# Patient Record
Sex: FEMALE | Race: WHITE | HISPANIC OR LATINO | Employment: FULL TIME | URBAN - METROPOLITAN AREA
[De-identification: names, ages, dates, MRNs, and addresses within clinical notes are randomized per-mention and may not be internally consistent; named-entity substitution may affect disease eponyms.]

---

## 2018-01-26 ENCOUNTER — OFFICE VISIT (OUTPATIENT)
Dept: URGENT CARE | Facility: PHYSICIAN GROUP | Age: 58
End: 2018-01-26
Payer: COMMERCIAL

## 2018-01-26 VITALS
TEMPERATURE: 98.1 F | WEIGHT: 150 LBS | DIASTOLIC BLOOD PRESSURE: 70 MMHG | RESPIRATION RATE: 16 BRPM | HEART RATE: 80 BPM | OXYGEN SATURATION: 95 % | SYSTOLIC BLOOD PRESSURE: 142 MMHG | HEIGHT: 63 IN | BODY MASS INDEX: 26.58 KG/M2

## 2018-01-26 DIAGNOSIS — J40 BRONCHITIS: ICD-10-CM

## 2018-01-26 DIAGNOSIS — H66.003 ACUTE SUPPURATIVE OTITIS MEDIA OF BOTH EARS WITHOUT SPONTANEOUS RUPTURE OF TYMPANIC MEMBRANES, RECURRENCE NOT SPECIFIED: ICD-10-CM

## 2018-01-26 PROCEDURE — 99203 OFFICE O/P NEW LOW 30 MIN: CPT | Performed by: PHYSICIAN ASSISTANT

## 2018-01-26 RX ORDER — BENZONATATE 100 MG/1
200 CAPSULE ORAL 3 TIMES DAILY PRN
Qty: 30 CAP | Refills: 0 | Status: SHIPPED | OUTPATIENT
Start: 2018-01-26 | End: 2018-02-26

## 2018-01-26 RX ORDER — AZITHROMYCIN 250 MG/1
TABLET, FILM COATED ORAL
Qty: 6 TAB | Refills: 0 | Status: SHIPPED | OUTPATIENT
Start: 2018-01-26 | End: 2018-02-26

## 2018-01-26 ASSESSMENT — PAIN SCALES - GENERAL: PAINLEVEL: 8=MODERATE-SEVERE PAIN

## 2018-01-27 ASSESSMENT — ENCOUNTER SYMPTOMS
CHILLS: 1
RHINORRHEA: 1
DIARRHEA: 0
ABDOMINAL PAIN: 0
SHORTNESS OF BREATH: 0
EYE DISCHARGE: 0
WHEEZING: 0
DIZZINESS: 0
MYALGIAS: 1
COUGH: 1
TINGLING: 0
HEADACHES: 0
FEVER: 0
EYE REDNESS: 0
VOMITING: 0
SORE THROAT: 1
NECK PAIN: 0
SPUTUM PRODUCTION: 1

## 2018-01-27 NOTE — PROGRESS NOTES
"Subjective:      Inessa Mcintyre is a 57 y.o. female who presents with Cough (x2 days. Cough, body aches, fever/chills.)            Cough   This is a new problem. Episode onset: 3-4 days ago. The problem has been gradually worsening. The problem occurs every few minutes. The cough is non-productive. Associated symptoms include chills, ear congestion, ear pain, myalgias, nasal congestion, postnasal drip, rhinorrhea and a sore throat. Pertinent negatives include no chest pain, eye redness, fever, headaches, rash, shortness of breath or wheezing. Nothing aggravates the symptoms. She has tried OTC cough suppressant for the symptoms. The treatment provided mild relief. Her past medical history is significant for bronchitis. There is no history of asthma or pneumonia.       Review of Systems   Constitutional: Positive for chills and malaise/fatigue. Negative for fever.   HENT: Positive for congestion, ear discharge, ear pain, postnasal drip, rhinorrhea and sore throat.    Eyes: Negative for discharge and redness.   Respiratory: Positive for cough and sputum production. Negative for shortness of breath and wheezing.    Cardiovascular: Negative for chest pain and leg swelling.   Gastrointestinal: Negative for abdominal pain, diarrhea and vomiting.   Genitourinary: Negative for dysuria and urgency.   Musculoskeletal: Positive for myalgias. Negative for neck pain.   Skin: Negative for itching and rash.   Neurological: Negative for dizziness, tingling and headaches.          Objective:     /70   Pulse 80   Temp 36.7 °C (98.1 °F)   Resp 16   Ht 1.6 m (5' 3\")   Wt 68 kg (150 lb)   SpO2 95%   Breastfeeding? No   BMI 26.57 kg/m²    PMH:  has a past medical history of Diabetes (CMS-[facility]).  MEDS:   Current Outpatient Prescriptions:   •  benzonatate (TESSALON) 100 MG Cap, Take 2 Caps by mouth 3 times a day as needed for Cough., Disp: 30 Cap, Rfl: 0  •  azithromycin (ZITHROMAX) 250 MG Tab, Take 2 tabs today, then 1 tab " daily til completed., Disp: 6 Tab, Rfl: 0  •  alprazolam (XANAX) 0.25 MG TABS, Take 1 Tab by mouth at bedtime as needed for Sleep., Disp: 30 Tab, Rfl: 0  ALLERGIES: No Known Allergies  SURGHX:   Past Surgical History:   Procedure Laterality Date   • ABDOMINAL HYSTERECTOMY TOTAL       SOCHX:  reports that she has never smoked. She has never used smokeless tobacco. She reports that she does not drink alcohol or use drugs.  FH: Family history was reviewed, no pertinent findings to report    Physical Exam   Constitutional: She is oriented to person, place, and time. She appears well-developed and well-nourished.   HENT:   Head: Normocephalic and atraumatic.   Mouth/Throat: No oropharyngeal exudate.   Ears- Canals clear- Bilateral TMs with bulging erythematous- and purulent middle ear effusion. Pos. PND, with slight erythema- without tonsillar edema or exudate.   Mild discharge noted bilaterally- to nares.      Eyes: EOM are normal. Pupils are equal, round, and reactive to light.   Neck: Normal range of motion. Neck supple.   Cardiovascular: Normal rate and regular rhythm.    No murmur heard.  Pulmonary/Chest: Effort normal and breath sounds normal. No respiratory distress.   Musculoskeletal: Normal range of motion. She exhibits no tenderness.   Lymphadenopathy:     She has no cervical adenopathy.   Neurological: She is alert and oriented to person, place, and time.   Skin: Skin is warm. No rash noted.   Psychiatric: She has a normal mood and affect. Her behavior is normal.   Vitals reviewed.              Assessment/Plan:     1. Bronchitis  - benzonatate (TESSALON) 100 MG Cap; Take 2 Caps by mouth 3 times a day as needed for Cough.  Dispense: 30 Cap; Refill: 0    2. Acute suppurative otitis media of both ears without spontaneous rupture of tympanic membranes, recurrence not specified  - azithromycin (ZITHROMAX) 250 MG Tab; Take 2 tabs today, then 1 tab daily til completed.  Dispense: 6 Tab; Refill: 0     Encouraged OTC  supportive meds PRN. Humidification, increase fluids, avoid night time dairy.   Patient given precautionary s/sx that mandate immediate follow up and evaluation in the ED. Advised of risks of not doing so.    DDX, Supportive care, and indications for immediate follow-up discussed with patient.    Instructed to return to clinic or nearest emergency department if we are not available for any change in condition, further concerns, or worsening of symptoms.    The patient demonstrated a good understanding and agreed with the treatment plan.

## 2018-02-26 ENCOUNTER — OFFICE VISIT (OUTPATIENT)
Dept: URGENT CARE | Facility: CLINIC | Age: 58
End: 2018-02-26
Payer: COMMERCIAL

## 2018-02-26 ENCOUNTER — HOSPITAL ENCOUNTER (OUTPATIENT)
Facility: MEDICAL CENTER | Age: 58
End: 2018-02-26
Attending: PHYSICIAN ASSISTANT
Payer: COMMERCIAL

## 2018-02-26 ENCOUNTER — HOSPITAL ENCOUNTER (OUTPATIENT)
Dept: RADIOLOGY | Facility: MEDICAL CENTER | Age: 58
End: 2018-02-26
Attending: PHYSICIAN ASSISTANT
Payer: COMMERCIAL

## 2018-02-26 VITALS
HEIGHT: 63 IN | RESPIRATION RATE: 14 BRPM | WEIGHT: 150 LBS | HEART RATE: 68 BPM | OXYGEN SATURATION: 99 % | BODY MASS INDEX: 26.58 KG/M2 | DIASTOLIC BLOOD PRESSURE: 74 MMHG | TEMPERATURE: 97.3 F | SYSTOLIC BLOOD PRESSURE: 134 MMHG

## 2018-02-26 DIAGNOSIS — R10.9 LEFT FLANK PAIN: ICD-10-CM

## 2018-02-26 DIAGNOSIS — R10.9 LEFT FLANK PAIN: Primary | ICD-10-CM

## 2018-02-26 LAB
APPEARANCE UR: CLEAR
BILIRUB UR STRIP-MCNC: NORMAL MG/DL
COLOR UR AUTO: NORMAL
GLUCOSE UR STRIP.AUTO-MCNC: NORMAL MG/DL
KETONES UR STRIP.AUTO-MCNC: NORMAL MG/DL
LEUKOCYTE ESTERASE UR QL STRIP.AUTO: NORMAL
NITRITE UR QL STRIP.AUTO: NORMAL
PH UR STRIP.AUTO: 6 [PH] (ref 5–8)
PROT UR QL STRIP: NORMAL MG/DL
RBC UR QL AUTO: NORMAL
SP GR UR STRIP.AUTO: 1
UROBILINOGEN UR STRIP-MCNC: 0.2 MG/DL

## 2018-02-26 PROCEDURE — 81002 URINALYSIS NONAUTO W/O SCOPE: CPT | Performed by: PHYSICIAN ASSISTANT

## 2018-02-26 PROCEDURE — 99214 OFFICE O/P EST MOD 30 MIN: CPT | Performed by: PHYSICIAN ASSISTANT

## 2018-02-26 PROCEDURE — 87186 SC STD MICRODIL/AGAR DIL: CPT

## 2018-02-26 PROCEDURE — 87086 URINE CULTURE/COLONY COUNT: CPT

## 2018-02-26 PROCEDURE — 87077 CULTURE AEROBIC IDENTIFY: CPT

## 2018-02-26 PROCEDURE — 74176 CT ABD & PELVIS W/O CONTRAST: CPT

## 2018-02-26 RX ORDER — CYCLOBENZAPRINE HCL 5 MG
5-10 TABLET ORAL 3 TIMES DAILY PRN
Qty: 30 TAB | Refills: 0 | Status: SHIPPED | OUTPATIENT
Start: 2018-02-26 | End: 2018-08-02

## 2018-02-26 RX ORDER — HYDROCODONE BITARTRATE AND ACETAMINOPHEN 5; 325 MG/1; MG/1
1-2 TABLET ORAL EVERY 4 HOURS PRN
Qty: 20 TAB | Refills: 0 | Status: SHIPPED | OUTPATIENT
Start: 2018-02-26 | End: 2018-03-01

## 2018-02-26 RX ORDER — NITROFURANTOIN 25; 75 MG/1; MG/1
100 CAPSULE ORAL 2 TIMES DAILY
Qty: 14 CAP | Refills: 0 | Status: SHIPPED | OUTPATIENT
Start: 2018-02-26 | End: 2018-03-05

## 2018-02-26 NOTE — PROGRESS NOTES
Subjective:      Pt is a 57 y.o. female who presents with Other (pt states she is really sore on (L) side and it hurts to walk. pt states this happened like 3 weeks ago and it went away)            HPI  Pt notes intense left flank pain x 1-2 days which happened and resolved 3 weeks ago but now is back. Pt denies known trauma or inciting event or trigger. Pt denies hx of kidney stones. Pt has not taken any Rx medications for this condition. Pt states the pain is a 9-10/10, aching in nature and worse at night. Pt denies CP, SOB, NVD, paresthesias, headaches, dizziness, change in vision, hives, or other joint pain or dysuria. The pt's medication list, problem list, and allergies have been evaluated and reviewed during today's visit.    PMH:  Past Medical History:   Diagnosis Date   • Diabetes (CMS-[facility])        PSH:  Past Surgical History:   Procedure Laterality Date   • ABDOMINAL HYSTERECTOMY TOTAL         Fam Hx:    family history includes Hypertension in her mother.  Family Status   Relation Status   • Mother        Soc HX:  Social History     Social History   • Marital status:      Spouse name: N/A   • Number of children: N/A   • Years of education: N/A     Occupational History   • Not on file.     Social History Main Topics   • Smoking status: Never Smoker   • Smokeless tobacco: Never Used   • Alcohol use No   • Drug use: No   • Sexual activity: No     Other Topics Concern   • Not on file     Social History Narrative   • No narrative on file         Medications:    Current Outpatient Prescriptions:   •  HYDROcodone-acetaminophen (NORCO) 5-325 MG Tab per tablet, Take 1-2 Tabs by mouth every four hours as needed for up to 3 days., Disp: 20 Tab, Rfl: 0  •  cyclobenzaprine (FLEXERIL) 5 MG tablet, Take 1-2 Tabs by mouth 3 times a day as needed., Disp: 30 Tab, Rfl: 0  •  nitrofurantoin monohydr macro (MACROBID) 100 MG Cap, Take 1 Cap by mouth 2 times a day for 7 days., Disp: 14 Cap, Rfl: 0  •  benzonatate  "(TESSALON) 100 MG Cap, Take 2 Caps by mouth 3 times a day as needed for Cough., Disp: 30 Cap, Rfl: 0  •  azithromycin (ZITHROMAX) 250 MG Tab, Take 2 tabs today, then 1 tab daily til completed., Disp: 6 Tab, Rfl: 0  •  alprazolam (XANAX) 0.25 MG TABS, Take 1 Tab by mouth at bedtime as needed for Sleep., Disp: 30 Tab, Rfl: 0      Allergies:  Patient has no known allergies.    ROS  Constitutional: Negative for fever, chills and malaise/fatigue.   HENT: Negative for congestion and sore throat.    Eyes: Negative for blurred vision, double vision and photophobia.   Respiratory: Negative for cough and shortness of breath.    Cardiovascular: Negative for chest pain and palpitations.   Gastrointestinal: Negative for heartburn, nausea, vomiting, abdominal pain, diarrhea and constipation.   Genitourinary: Negative for dysuria and +left flank pain.   Musculoskeletal:  +left flank pain  Skin: Negative for itching and rash.   Neurological: Negative for dizziness, tingling and headaches.   Endo/Heme/Allergies: Does not bruise/bleed easily.   Psychiatric/Behavioral: Negative for depression. The patient is not nervous/anxious.           Objective:     /74   Pulse 68   Temp 36.3 °C (97.3 °F)   Resp 14   Ht 1.6 m (5' 3\")   Wt 68 kg (150 lb)   SpO2 99%   BMI 26.57 kg/m²      Physical Exam   Musculoskeletal:        Lumbar back: She exhibits decreased range of motion, tenderness, pain and spasm. She exhibits no bony tenderness, no swelling, no edema, no deformity, no laceration and normal pulse.        Back:           Constitutional: PT is oriented to person, place, and time. PT appears well-developed and well-nourished. No distress.   HENT:   Head: Normocephalic and atraumatic.   Mouth/Throat: Oropharynx is clear and moist. No oropharyngeal exudate.   Eyes: Conjunctivae normal and EOM are normal. Pupils are equal, round, and reactive to light.   Neck: Normal range of motion. Neck supple. No thyromegaly present.   "   Cardiovascular: Normal rate, regular rhythm, normal heart sounds and intact distal pulses.  Exam reveals no gallop and no friction rub.    No murmur heard.  Pulmonary/Chest: Effort normal and breath sounds normal. No respiratory distress. PT has no wheezes. PT has no rales. Pt exhibits no tenderness.   Abdominal: Soft. Bowel sounds are normal. PT exhibits no distension and no mass. There is no tenderness. There is no rebound and no guarding.   Neurological: PT is alert and oriented to person, place, and time. PT has normal reflexes. No cranial nerve deficit.   Skin: Skin is warm and dry. No rash noted. PT is not diaphoretic. No erythema.       Psychiatric: PT has a normal mood and affect. PT behavior is normal. Judgment and thought content normal.      RADS:  Narrative       2/26/2018 3:44 PM    HISTORY/REASON FOR EXAM:  flank pain.      TECHNIQUE/EXAM DESCRIPTION AND NUMBER OF VIEWS:  CT scan renal/colic without contrast.    Helical images of the abdomen and pelvis were obtained from the diaphragmatic domes through the pubic symphysis.    Low dose optimization technique was utilized for this CT exam including automated exposure control and adjustment of the mA and/or kV according to patient size.    COMPARISON: 8/16/2013.    FINDINGS:  Evaluation of parenchymal and vascular structures is limited by the lack of intravenous contrast.    Lung bases:  There is mild bibasilar atelectasis    Abdomen:    No free air is seen in the abdomen or pelvis. A cyst is again seen in the right lobe of the liver measuring 2.5 cm. Small hypodense lesion is seen in the posterior right lobe of the liver also likely a cyst as it was seen on prior. Gallbladder appears   unremarkable. Spleen appears unremarkable. No adrenal mass is identified. There is no evidence of hydronephrosis. Pancreas appears unremarkable. Aorta is not aneurysmal. There is mild atherosclerotic plaque. There are small inguinal, retroperitoneal and   mesenteric  lymph nodes.    There is no evidence of bowel obstruction. There are colonic diverticula. Terminal ileum is unremarkable. There is no evidence of acute appendicitis.    Duodenal diverticula are likely present. Bladder is mildly distended. No free fluid is seen in the abdomen or pelvis. Uterus is surgically absent.       Impression       No evidence of renal, ureteral or bladder calculi. No hydronephrosis.    Colonic diverticulosis.    Hepatic cysts.    Duodenal diverticula are likely present.   Reading Provider Reading Date   Gilda Alvarado M.D. Feb 26, 2018   Signing Provider Signing Date Signing Time   Gilda Alvarado M.D. Feb 26, 2018  4:08 PM       Assessment/Plan:     1. Left flank pain    - POCT Urinalysis-->[facility]  - CT-ABDOMEN-PELVIS WITH; Future  - HYDROcodone-acetaminophen (NORCO) 5-325 MG Tab per tablet; Take 1-2 Tabs by mouth every four hours as needed for up to 3 days.  Dispense: 20 Tab; Refill: 0  - Consent for Opiate Prescription  - cyclobenzaprine (FLEXERIL) 5 MG tablet; Take 1-2 Tabs by mouth 3 times a day as needed.  Dispense: 30 Tab; Refill: 0  - nitrofurantoin monohydr macro (MACROBID) 100 MG Cap; Take 1 Cap by mouth 2 times a day for 7 days.  Dispense: 14 Cap; Refill: 0  - URINE CULTURE(NEW); Future    Concern for kidney hydronephrosis, other deep organ etiology  [place]  Aware web site evaluation: I have obtained and reviewed patient utilization report from [facility] pharmacy database prior to writing prescription for controlled substance II, III or IV per [place] bill . Based on the report and my clinical assessment the prescription is medically necessary.   NSAIDs for pain 1-5, Norco for pain 6-10 or to help get to sleep.  Opioid consent-->completed and signed  Rest, fluids encouraged.  AVS with medical info given.  Pt was in full understanding and agreement with the plan.  Follow-up as needed if symptoms worsen or fail to improve.

## 2018-02-27 ENCOUNTER — SUPERVISING PHYSICIAN REVIEW (OUTPATIENT)
Dept: URGENT CARE | Facility: CLINIC | Age: 58
End: 2018-02-27

## 2018-02-28 LAB
BACTERIA UR CULT: ABNORMAL
BACTERIA UR CULT: ABNORMAL
SIGNIFICANT IND 70042: ABNORMAL
SITE SITE: ABNORMAL
SOURCE SOURCE: ABNORMAL

## 2018-08-02 ENCOUNTER — OFFICE VISIT (OUTPATIENT)
Dept: MEDICAL GROUP | Age: 58
End: 2018-08-02
Payer: COMMERCIAL

## 2018-08-02 VITALS
HEIGHT: 63 IN | HEART RATE: 76 BPM | DIASTOLIC BLOOD PRESSURE: 86 MMHG | OXYGEN SATURATION: 98 % | TEMPERATURE: 97.4 F | SYSTOLIC BLOOD PRESSURE: 126 MMHG | WEIGHT: 160.6 LBS | BODY MASS INDEX: 28.46 KG/M2

## 2018-08-02 DIAGNOSIS — Z00.00 PREVENTATIVE HEALTH CARE: ICD-10-CM

## 2018-08-02 DIAGNOSIS — Z23 NEED FOR VACCINATION: ICD-10-CM

## 2018-08-02 DIAGNOSIS — R07.89 ATYPICAL CHEST PAIN: ICD-10-CM

## 2018-08-02 DIAGNOSIS — E78.00 PURE HYPERCHOLESTEROLEMIA: ICD-10-CM

## 2018-08-02 DIAGNOSIS — Z12.31 VISIT FOR SCREENING MAMMOGRAM: ICD-10-CM

## 2018-08-02 DIAGNOSIS — F32.0 CURRENT MILD EPISODE OF MAJOR DEPRESSIVE DISORDER WITHOUT PRIOR EPISODE (HCC): ICD-10-CM

## 2018-08-02 DIAGNOSIS — Z12.11 SCREENING FOR COLON CANCER: ICD-10-CM

## 2018-08-02 PROBLEM — F32.9 MAJOR DEPRESSIVE DISORDER: Status: ACTIVE | Noted: 2018-08-02

## 2018-08-02 PROCEDURE — 90471 IMMUNIZATION ADMIN: CPT | Performed by: FAMILY MEDICINE

## 2018-08-02 PROCEDURE — 99214 OFFICE O/P EST MOD 30 MIN: CPT | Mod: 25 | Performed by: FAMILY MEDICINE

## 2018-08-02 PROCEDURE — 90715 TDAP VACCINE 7 YRS/> IM: CPT | Performed by: FAMILY MEDICINE

## 2018-08-02 RX ORDER — SODIUM PHOSPHATE,MONO-DIBASIC 19G-7G/118
500 ENEMA (ML) RECTAL
COMMUNITY

## 2018-08-02 RX ORDER — ALPRAZOLAM 0.5 MG/1
0.5 TABLET ORAL NIGHTLY PRN
Qty: 30 TAB | Refills: 0 | Status: SHIPPED | OUTPATIENT
Start: 2018-08-02 | End: 2018-09-01

## 2018-08-02 RX ORDER — OMEGA-3 FATTY ACIDS/FISH OIL 300-1000MG
CAPSULE ORAL
COMMUNITY

## 2018-08-02 RX ORDER — DULOXETIN HYDROCHLORIDE 20 MG/1
20 CAPSULE, DELAYED RELEASE ORAL DAILY
Qty: 90 CAP | Refills: 1 | Status: SHIPPED | OUTPATIENT
Start: 2018-08-02 | End: 2019-02-19 | Stop reason: SDUPTHER

## 2018-08-02 NOTE — PROGRESS NOTES
This medical record contains text that has been entered with the assistance of computer voice recognition and dictation software.  Therefore, it may contain unintended errors in text, spelling, punctuation, or grammar    Chief Complaint   Patient presents with   • Chest Pain     chest feels tight    • Anxiety     feels like shes SOB         Inessa Mcintyre is a 57 y.o. female here evaluation and management of: Establish care, atypical chest pain, depression, many HMI topics, anxiety      HPI:     Preventative health care    The patient denied any chest pain, no sob, no rivas, no  pnd, no orthopnea, no headache, no changes in vision, no numbness or tingling, no nausea, no diarrhea, no abdominal pain, no fevers, no chills, no bright red blood per rectum, no  difficulty urinating, no burning during micturition, no depressed mood, no other concerns.    Family History of Cancer---brother with colon cancer    Family History of CAD---none     Occupation----works [employer],    Exercise---walks at work      Patient is due for repeat colon cancer screening  She is also due for mammogram Pap smear Tdap vaccine.          Atypical chest pain  Patient states that she often gets chest pain/tightness usually at work.  She states that the tightness is throughout all of her chest not localized to the sternum or substernal region, there is stress at work especially the people that she works with.  She thinks it is related to stress.  There is no significant family history of coronary artery disease, she denies any dyspnea on exertion it is not chest pain as chest tightness.  Denies any diaphoreses no nausea no vomiting it does not radiate anywhere she does not smoke cigarettes.    Major depressive disorder  Patient states that she has major stress at work she has been working at the [employer] for over 34 years.  There is several coworkers who do not get along with her so she often has severe stress.  She often cries in the morning  "when she has to go to work.  She is tried Prozac and Zoloft in the past but felt it was not too effective.  Denies any suicidal homicidal ideations.    Screening for colon cancer  She is due for colon cancer screening.    Current medicines (including changes today)  Current Outpatient Prescriptions   Medication Sig Dispense Refill   • Ibuprofen (ADVIL) 200 MG Cap Take  by mouth.     • Multiple Vitamin (MULTI-VITAMIN DAILY PO) Take  by mouth.     • glucosamine Sulfate 500 MG Cap Take 500 mg by mouth 3 times a day, with meals.     • DULoxetine (CYMBALTA) 20 MG Cap DR Particles Take 1 Cap by mouth every day. 90 Cap 1   • ALPRAZolam (XANAX) 0.5 MG Tab Take 1 Tab by mouth at bedtime as needed for Sleep for up to 30 days. 30 Tab 0     No current facility-administered medications for this visit.      She  has a past medical history of Diabetes (HCC).  She  has a past surgical history that includes abdominal hysterectomy total.  Social History   Substance Use Topics   • Smoking status: Never Smoker   • Smokeless tobacco: Never Used   • Alcohol use No     Social History     Social History Narrative   • No narrative on file     Family History   Problem Relation Age of Onset   • Hypertension Mother      Family Status   Relation Status   • Mo (Not Specified)         ROS    Please see hpi     All other systems reviewed and are negative     Objective:     Blood pressure 126/86, pulse 76, temperature 36.3 °C (97.4 °F), height 1.6 m (5' 2.99\"), weight 72.8 kg (160 lb 9.6 oz), SpO2 98 %, not currently breastfeeding. Body mass index is 28.46 kg/m².  Physical Exam:    Constitutional: Alert, no distress.  Skin: Warm, dry, good turgor, no rashes in visible areas.  Eye: Equal, round and reactive, conjunctiva clear, lids normal.  ENMT: Lips without lesions, good dentition, oropharynx clear.  Neck: Trachea midline, no masses, no thyromegaly. No cervical or supraclavicular lymphadenopathy.  Respiratory: Unlabored respiratory effort, lungs " clear to auscultation, no wheezes, no ronchi.  Cardiovascular: Normal S1, S2, no murmur, no edema.  Abdomen: Soft, non-tender, no masses, no hepatosplenomegaly.  Psych: Alert and oriented x3, normal affect and mood.    EKG reveals normal sinus rhythm, normal axis, heart rate of 63 no ST segment abnormalities no Q waves normal QT interval.        Assessment and Plan:   The following treatment plan was discussed      1. Preventative health care  Care has been established  We need baseline labs to establish a clinical profile    Requested Medical records to be sent to us  Denies intimate partner viloence        2. Pure hypercholesterolemia  Due for labs    - LIPID PROFILE; Future  - CBC WITHOUT DIFFERENTIAL; Future  - COMP METABOLIC PANEL; Future    3. Screening for colon cancer  Over due    - REFERRAL TO GASTROENTEROLOGY    4. Visit for screening mammogram  Due for breast cancer screening    - MA-MAMMO SCREENING BILAT W/SHELLY W/O CAD; Future    5. Atypical chest pain    EKG was unremarkable  This is likely related to anxiety  However we will obtain a stress echocardiogram.    - EKG; Future  - ECHO-REST/STRESS W/ CONTRAST; Future    6. Current mild episode of major depressive disorder without prior episode (HCC)    Begin duloxetine  Xanax for as needed panic attacks not daily use as instructed  Return to clinic in 4 weeks    - DULoxetine (CYMBALTA) 20 MG Cap DR Particles; Take 1 Cap by mouth every day.  Dispense: 90 Cap; Refill: 1  - ALPRAZolam (XANAX) 0.5 MG Tab; Take 1 Tab by mouth at bedtime as needed for Sleep for up to 30 days.  Dispense: 30 Tab; Refill: 0    7. Need for vaccination  Vaccine was administered today without adverse event.    - TDAP VACCINE =>8YO IM        HEALTH MAINTENANCE:    Instructed to Follow up in clinic or ER for worsening symptoms, difficulty breathing, lack of expected recovery, or should new symptoms or problems arise.    Followup: Return in about 4 weeks (around 8/30/2018) for  Reevaluation, Discussing tollerance and efficacy of medication.       Once again this medical record contains text that has been entered with the assistance of computer voice recognition and dictation software.  Therefore, it may contain unintended errors in text, spelling, punctuation, or grammar

## 2018-08-02 NOTE — ASSESSMENT & PLAN NOTE
Patient states that she has major stress at work she has been working at the [employer] for over 34 years.  There is several coworkers who do not get along with her so she often has severe stress.  She often cries in the morning when she has to go to work.  She is tried Prozac and Zoloft in the past but felt it was not too effective.  Denies any suicidal homicidal ideations.

## 2018-08-02 NOTE — ASSESSMENT & PLAN NOTE
Patient states that she often gets chest pain/tightness usually at work.  She states that the tightness is throughout all of her chest not localized to the sternum or substernal region, there is stress at work especially the people that she works with.  She thinks it is related to stress.  There is no significant family history of coronary artery disease, she denies any dyspnea on exertion it is not chest pain as chest tightness.  Denies any diaphoreses no nausea no vomiting it does not radiate anywhere she does not smoke cigarettes.

## 2018-08-02 NOTE — ASSESSMENT & PLAN NOTE
The patient denied any chest pain, no sob, no rivas, no  pnd, no orthopnea, no headache, no changes in vision, no numbness or tingling, no nausea, no diarrhea, no abdominal pain, no fevers, no chills, no bright red blood per rectum, no  difficulty urinating, no burning during micturition, no depressed mood, no other concerns.    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

## 2018-08-14 ENCOUNTER — APPOINTMENT (OUTPATIENT)
Dept: MEDICAL GROUP | Age: 58
End: 2018-08-14
Payer: COMMERCIAL

## 2018-08-15 ENCOUNTER — APPOINTMENT (OUTPATIENT)
Dept: RADIOLOGY | Facility: MEDICAL CENTER | Age: 58
End: 2018-08-15
Attending: FAMILY MEDICINE
Payer: COMMERCIAL

## 2018-08-18 ENCOUNTER — HOSPITAL ENCOUNTER (OUTPATIENT)
Dept: LAB | Facility: MEDICAL CENTER | Age: 58
End: 2018-08-18
Attending: FAMILY MEDICINE
Payer: COMMERCIAL

## 2018-08-18 ENCOUNTER — HOSPITAL ENCOUNTER (OUTPATIENT)
Dept: RADIOLOGY | Facility: MEDICAL CENTER | Age: 58
End: 2018-08-18
Attending: FAMILY MEDICINE
Payer: COMMERCIAL

## 2018-08-18 DIAGNOSIS — Z12.31 VISIT FOR SCREENING MAMMOGRAM: ICD-10-CM

## 2018-08-18 DIAGNOSIS — E78.00 PURE HYPERCHOLESTEROLEMIA: ICD-10-CM

## 2018-08-18 LAB
ALBUMIN SERPL BCP-MCNC: 4.1 G/DL (ref 3.2–4.9)
ALBUMIN/GLOB SERPL: 1.4 G/DL
ALP SERPL-CCNC: 69 U/L (ref 30–99)
ALT SERPL-CCNC: 17 U/L (ref 2–50)
ANION GAP SERPL CALC-SCNC: 7 MMOL/L (ref 0–11.9)
AST SERPL-CCNC: 16 U/L (ref 12–45)
BILIRUB SERPL-MCNC: 0.6 MG/DL (ref 0.1–1.5)
BUN SERPL-MCNC: 11 MG/DL (ref 8–22)
CALCIUM SERPL-MCNC: 9.2 MG/DL (ref 8.5–10.5)
CHLORIDE SERPL-SCNC: 107 MMOL/L (ref 96–112)
CHOLEST SERPL-MCNC: 224 MG/DL (ref 100–199)
CO2 SERPL-SCNC: 26 MMOL/L (ref 20–33)
CREAT SERPL-MCNC: 0.66 MG/DL (ref 0.5–1.4)
ERYTHROCYTE [DISTWIDTH] IN BLOOD BY AUTOMATED COUNT: 41.9 FL (ref 35.9–50)
GLOBULIN SER CALC-MCNC: 2.9 G/DL (ref 1.9–3.5)
GLUCOSE SERPL-MCNC: 94 MG/DL (ref 65–99)
HCT VFR BLD AUTO: 42.6 % (ref 37–47)
HDLC SERPL-MCNC: 49 MG/DL
HGB BLD-MCNC: 14.1 G/DL (ref 12–16)
LDLC SERPL CALC-MCNC: 149 MG/DL
MCH RBC QN AUTO: 29.1 PG (ref 27–33)
MCHC RBC AUTO-ENTMCNC: 33.1 G/DL (ref 33.6–35)
MCV RBC AUTO: 87.8 FL (ref 81.4–97.8)
PLATELET # BLD AUTO: 296 K/UL (ref 164–446)
PMV BLD AUTO: 10.6 FL (ref 9–12.9)
POTASSIUM SERPL-SCNC: 4.1 MMOL/L (ref 3.6–5.5)
PROT SERPL-MCNC: 7 G/DL (ref 6–8.2)
RBC # BLD AUTO: 4.85 M/UL (ref 4.2–5.4)
SODIUM SERPL-SCNC: 140 MMOL/L (ref 135–145)
TRIGL SERPL-MCNC: 129 MG/DL (ref 0–149)
WBC # BLD AUTO: 5.7 K/UL (ref 4.8–10.8)

## 2018-08-18 PROCEDURE — 77067 SCR MAMMO BI INCL CAD: CPT

## 2018-08-18 PROCEDURE — 80053 COMPREHEN METABOLIC PANEL: CPT

## 2018-08-18 PROCEDURE — 36415 COLL VENOUS BLD VENIPUNCTURE: CPT

## 2018-08-18 PROCEDURE — 85027 COMPLETE CBC AUTOMATED: CPT

## 2018-08-18 PROCEDURE — 80061 LIPID PANEL: CPT

## 2018-08-20 ENCOUNTER — OFFICE VISIT (OUTPATIENT)
Dept: MEDICAL GROUP | Age: 58
End: 2018-08-20
Payer: COMMERCIAL

## 2018-08-20 ENCOUNTER — HOSPITAL ENCOUNTER (OUTPATIENT)
Facility: MEDICAL CENTER | Age: 58
End: 2018-08-20
Attending: PHYSICIAN ASSISTANT
Payer: COMMERCIAL

## 2018-08-20 VITALS
OXYGEN SATURATION: 96 % | DIASTOLIC BLOOD PRESSURE: 60 MMHG | TEMPERATURE: 97.5 F | WEIGHT: 160.4 LBS | HEIGHT: 63 IN | BODY MASS INDEX: 28.42 KG/M2 | HEART RATE: 66 BPM | SYSTOLIC BLOOD PRESSURE: 112 MMHG

## 2018-08-20 DIAGNOSIS — Z01.419 ENCOUNTER FOR GYNECOLOGICAL EXAMINATION: ICD-10-CM

## 2018-08-20 DIAGNOSIS — H65.111 ACUTE MUCOID OTITIS MEDIA OF RIGHT EAR: ICD-10-CM

## 2018-08-20 DIAGNOSIS — Z12.4 SCREENING FOR MALIGNANT NEOPLASM OF CERVIX: ICD-10-CM

## 2018-08-20 DIAGNOSIS — Z11.51 SCREENING FOR HPV (HUMAN PAPILLOMAVIRUS): ICD-10-CM

## 2018-08-20 PROCEDURE — 99396 PREV VISIT EST AGE 40-64: CPT | Mod: 25 | Performed by: PHYSICIAN ASSISTANT

## 2018-08-20 PROCEDURE — 88175 CYTOPATH C/V AUTO FLUID REDO: CPT

## 2018-08-20 PROCEDURE — 99214 OFFICE O/P EST MOD 30 MIN: CPT | Performed by: PHYSICIAN ASSISTANT

## 2018-08-20 PROCEDURE — 87624 HPV HI-RISK TYP POOLED RSLT: CPT

## 2018-08-20 RX ORDER — AMOXICILLIN 500 MG/1
500 CAPSULE ORAL 2 TIMES DAILY
Qty: 20 CAP | Refills: 0 | Status: SHIPPED | OUTPATIENT
Start: 2018-08-20 | End: 2018-08-30

## 2018-08-20 ASSESSMENT — PATIENT HEALTH QUESTIONNAIRE - PHQ9
SUM OF ALL RESPONSES TO PHQ QUESTIONS 1-9: 5
CLINICAL INTERPRETATION OF PHQ2 SCORE: 1
5. POOR APPETITE OR OVEREATING: 0 - NOT AT ALL

## 2018-08-20 NOTE — PROGRESS NOTES
"SUBJECTIVE:   Chief Complaint   Patient presents with   • Gynecologic Exam       58 y.o. female for annual routine gynecologic exam    Obstetric History       T3      L3     SAB0   TAB0   Ectopic0   Molar0   Multiple0   Live Births0       History   Sexual Activity   • Sexual activity: No       Last Pap: >5 years  H/O Abnormal Pap no  Partial Hysterectomy at age 39--- unclear what was removed. Thinks she has a cervix...    No significant bloating/fluid retention, pelvic pain or abnormal vaginal discharge   She does perform regular self breast exams.  No breast tenderness, mass, nipple discharge, changes in size or contour, or abnormal cyclic discomfort.  Mammo was done on Saturday. Not read by radiologist yet.    Acute mucoid otitis media of right ear  New problem.  Reports a lot of headaches-- right ear has a lot of watery wax when she cleans it. Has to clean it daily due to fluid feeling in ear.   Thinks she has an ear infection. No ear pain or fever. Reports headaches are behind her right ear. Reports headache every day. Takes 800 ibuprofen BID. Helps with the pain. Denies decreased hearing.         ROS:    No urinary tract symptoms. Mixed incontinence-- s/p sling 5-6 years but didn't work.   No abdominal pain, change in bowel habits, black or bloody stools.    No unusual headaches, no visual changes. No prolonged cough. No dyspnea or chest pain on exertion.  + anxiety  No new/concerning skin lesions, concerns.     Exercise: sporadic irregular exercise --\"walk a lot\"  Diet: Reports diet is fair. Some fruits and veggies. A lot of sweets. Mostly water and coffee    Social History   Substance Use Topics   • Smoking status: Never Smoker   • Smokeless tobacco: Never Used   • Alcohol use No      Comment: very rarely       Patient Active Problem List    Diagnosis Date Noted   • Preventative health care 2018   • Screening for colon cancer 2018   • Pure hypercholesterolemia 2018   • Major " "depressive disorder 2018   • Need for vaccination 2018   • Atypical chest pain 2014       Past Medical History:   Diagnosis Date   • Diabetes (HCC)      Past Surgical History:   Procedure Laterality Date   • Hysterectomy-- total vs partial unknown           Family History   Problem Relation Age of Onset   • Hypertension Mother    • Alzheimer's Disease Mother    • Alcohol/Drug Father         ETOH   • Cancer Brother 70        colon      Family Status   Relation Status   • Mo    • Fa    • Sis Alive   • Bro    • Sis Alive   • Bro Alive   • Bro Alive   • Bro Alive   • Bro Alive   • Bro Alive   • Bro Alive         Current medicines (including changes today)  Current Outpatient Prescriptions   Medication Sig Dispense Refill   • amoxicillin (AMOXIL) 500 MG Cap Take 1 Cap by mouth 2 times a day for 10 days. 20 Cap 0   • Ibuprofen (ADVIL) 200 MG Cap Take  by mouth.     • Multiple Vitamin (MULTI-VITAMIN DAILY PO) Take  by mouth.     • glucosamine Sulfate 500 MG Cap Take 500 mg by mouth 3 times a day, with meals.     • DULoxetine (CYMBALTA) 20 MG Cap DR Particles Take 1 Cap by mouth every day. 90 Cap 1   • ALPRAZolam (XANAX) 0.5 MG Tab Take 1 Tab by mouth at bedtime as needed for Sleep for up to 30 days. 30 Tab 0     No current facility-administered medications for this visit.            Allergies: Patient has no known allergies.     Preventive Care:  Health Maintenance Topics with due status: Overdue       Topic Date Due    PAP SMEAR-- today 08/15/1980    MAMMOGRAM-- completed this weekend 08/15/1999    COLONOSCOPY-- last was done at age 46 plans to schedule.  08/15/2009    IMM ZOSTER VACCINES 08/15/2009       OBJECTIVE:   /60   Pulse 66   Temp 36.4 °C (97.5 °F)   Ht 1.6 m (5' 3\")   Wt 72.8 kg (160 lb 6.4 oz)   SpO2 96%   BMI 28.41 kg/m²   Body mass index is 28.41 kg/m².  A chaperone was offered to the patient during today's exam. Patient declined chaperone.    Gen: Well " developed, well nourished in no acute distress.   Skin: Pink, warm, and dry  HEENT: conjunctiva non-injected, sclera non-icteric. EOMs intact.   Nasal mucosa without edema nor erythema. No facial tenderness  Pinna normal. Right EAC erythematous with greenish discharge. TM bulging and erythematous. No perforation identified. Left TM pearly gray.   Oral mucous membranes pink and moist with no lesions.  Neck: Supple, trachea midline. No adenopathy or masses in the neck or supraclavicular regions.  Lungs: Effort is normal. Clear to auscultation bilaterally with good excursion.  CV: regular rate and rhythm.  Abdomen: soft, nontender, + BS. No HSM.  No CVAT  Ext: no edema, color normal, vascularity normal, temperature normal  Alert and oriented Eye contact is good, speech goal directed, affect calm     Breast Exam: Performed with instruction during examination. No axillary lymphadenopathy, no skin changes, no dominant masses. No nipple retraction  Pelvic Exam:  Normal external genitalia with no lesions. Normal vaginal mucosa with normal rugation and no discharge. Cervix (Stump) with no visible lesions. No cervical motion tenderness. Pap is obtained and the specimen was sent to lab      <ASSESSMENT and PLAN>  1. Acute mucoid otitis media of right ear -Rx: amoxicillin BID per up-to-date. Will follow-up in 1 month. Avoid placing objects in ear. Any change or worsening of signs or symptoms, patient encouraged to follow-up or report to emergency room for further evaluation. Patient verbalizes understanding and agrees. amoxicillin (AMOXIL) 500 MG Cap   2. Encounter for gynecological examination -Discussed  breast self exam, mammography screening, feminine hygiene, menopause, adequate intake of calcium and vitamin D, diet and exercise     3. Screening for malignant neoplasm of cervix -pap obtained from cervical stump and sent to lab.   THINPREP PAP WITH HPV   4. Screening for HPV (human papillomavirus)  THINPREP PAP WITH HPV        Follow-up in 1 years for next Gyn exam and 3-5 years for next and likely final Pap.   Return in about 1 week (around 8/27/2018) for follow-up on right ear infection, sooner if needed.        This dictation was created using voice recognition software. The accuracy of the dictation is limited to the abilities of the software. I expect there may be some errors of grammar and possibly content. The MA notes were reviewed and certain aspects of this information were incorporated into this note.

## 2018-08-20 NOTE — LETTER
[facility]  Hamilton Penn M.D.  [address]   [place] [address]  Fax: [phone]   Authorization for Release/Disclosure of   Protected Health Information   Name: JIN HOGUE : 1960 SSN: [id number]   Address: [facility] [address] [place] [address] Phone:    [phone] (home)    I authorize the entity listed below to release/disclose the PHI below to:   [facility]/Hamilton Penn M.D. and Enid Harley P.A.-C.   Provider or Entity Name:  [facility]   Address   City, [facility], Zip:               [address] [place] [address] [place] [address]   Phone:  [phone]      Fax:      [phone]        Reason for request: continuity of care   Information to be released:    [ X ] LAST COLONOSCOPY,  including any PATH REPORT and follow-up  [ X ] LAST FIT/COLOGUARD RESULT [  ] LAST DEXA  [  ] LAST MAMMOGRAM  [  ] LAST PAP  [  ] LAST LABS [  ] RETINA EXAM REPORT  [  ] IMMUNIZATION RECORDS  [  ] Release all info      [  ] Check here and initial the line next to each item to release ALL health information INCLUDING  _____ Care and treatment for drug and / or alcohol abuse  _____ HIV testing, infection status, or AIDS  _____ Genetic Testing    DATES OF SERVICE OR TIME PERIOD TO BE DISCLOSED: _____________  I understand and acknowledge that:  * This Authorization may be revoked at any time by you in writing, except if your health information has already been used or disclosed.  * Your health information that will be used or disclosed as a result of you signing this authorization could be re-disclosed by the recipient. If this occurs, your re-disclosed health information may no longer be protected by State or Federal laws.  * You may refuse to sign this Authorization. Your refusal will not affect your ability to obtain treatment.  * This Authorization becomes effective upon signing and will  on (date) __________.      If no date is indicated, this Authorization will   one (1) year from the signature date.    Name: Inessa Mcintyre    Signature:   Date:     8/20/2018       PLEASE FAX REQUESTED RECORDS BACK TO: [phone]

## 2018-08-21 DIAGNOSIS — Z12.4 SCREENING FOR MALIGNANT NEOPLASM OF CERVIX: ICD-10-CM

## 2018-08-21 DIAGNOSIS — Z11.51 SCREENING FOR HPV (HUMAN PAPILLOMAVIRUS): ICD-10-CM

## 2018-08-22 LAB
CYTOLOGY REG CYTOL: NORMAL
HPV HR 12 DNA CVX QL NAA+PROBE: NEGATIVE
HPV16 DNA SPEC QL NAA+PROBE: NEGATIVE
HPV18 DNA SPEC QL NAA+PROBE: NEGATIVE
SPECIMEN SOURCE: NORMAL

## 2018-08-31 ENCOUNTER — OFFICE VISIT (OUTPATIENT)
Dept: MEDICAL GROUP | Age: 58
End: 2018-08-31
Payer: COMMERCIAL

## 2018-08-31 VITALS
DIASTOLIC BLOOD PRESSURE: 74 MMHG | OXYGEN SATURATION: 97 % | HEART RATE: 67 BPM | HEIGHT: 63 IN | WEIGHT: 159 LBS | TEMPERATURE: 97.5 F | BODY MASS INDEX: 28.17 KG/M2 | SYSTOLIC BLOOD PRESSURE: 118 MMHG

## 2018-08-31 DIAGNOSIS — H66.003 ACUTE SUPPURATIVE OTITIS MEDIA OF BOTH EARS WITHOUT SPONTANEOUS RUPTURE OF TYMPANIC MEMBRANES, RECURRENCE NOT SPECIFIED: ICD-10-CM

## 2018-08-31 PROCEDURE — 99213 OFFICE O/P EST LOW 20 MIN: CPT | Performed by: FAMILY MEDICINE

## 2018-08-31 RX ORDER — PREDNISONE 20 MG/1
TABLET ORAL
Qty: 12 TAB | Refills: 0 | Status: SHIPPED | OUTPATIENT
Start: 2018-08-31

## 2018-08-31 RX ORDER — NAPROXEN 500 MG/1
500 TABLET ORAL 2 TIMES DAILY WITH MEALS
Qty: 60 TAB | Refills: 0 | Status: SHIPPED | OUTPATIENT
Start: 2018-08-31 | End: 2020-06-11

## 2018-08-31 NOTE — ASSESSMENT & PLAN NOTE
Patient states on 20 August she was seen and treated for otitis media.  She was given amoxicillin her right ear was draining and it stopped but now her left ear is draining and she has pain which is very uncomfortable.  Denies any fevers no chills this has not affected her appetite no nausea no vomiting no headaches.  She has been compliant with amoxicillin.

## 2018-09-06 ENCOUNTER — TELEPHONE (OUTPATIENT)
Dept: MEDICAL GROUP | Age: 58
End: 2018-09-06

## 2018-09-06 NOTE — TELEPHONE ENCOUNTER
1. Caller Name: Inessa Mcintyre                                           Call Back Number: [phone] (home)     Pt notified that drops were sent to [facility].  Pt is wondering if this medication will help with pain.  Pt has finished previously prescribed medication and is still in significant pain.

## 2018-09-06 NOTE — TELEPHONE ENCOUNTER
VOICEMAIL  1. Caller Name: Inessa Mcintyre                        Call Back Number: [phone] (home)       2. Message: Pt is requesting ear drops for her ear infection.  Pt last seen on 8/31/18 for ear ache.      3. Patient approves office to leave a detailed voicemail/MyChart message: yes

## 2018-09-07 NOTE — TELEPHONE ENCOUNTER
Phone Number Called: [phone] (home)       Message: Checking to see if Pt has picked up medication and if it is helping    Left Message for patient to call back: yes

## 2018-09-11 ENCOUNTER — HOSPITAL ENCOUNTER (OUTPATIENT)
Dept: CARDIOLOGY | Facility: MEDICAL CENTER | Age: 58
End: 2018-09-11
Attending: FAMILY MEDICINE
Payer: COMMERCIAL

## 2018-09-11 DIAGNOSIS — R07.89 ATYPICAL CHEST PAIN: ICD-10-CM

## 2018-09-11 LAB
LV EJECT FRACT  99904: 60
LV EJECT FRACT MOD 2C 99903: 67.84
LV EJECT FRACT MOD 4C 99902: 64.2
LV EJECT FRACT MOD BP 99901: 65.5

## 2018-09-11 PROCEDURE — 93350 STRESS TTE ONLY: CPT

## 2018-09-11 PROCEDURE — 93018 CV STRESS TEST I&R ONLY: CPT | Performed by: INTERNAL MEDICINE

## 2018-09-11 PROCEDURE — 93017 CV STRESS TEST TRACING ONLY: CPT

## 2018-09-11 PROCEDURE — 93350 STRESS TTE ONLY: CPT | Mod: 26 | Performed by: INTERNAL MEDICINE

## 2020-02-18 DIAGNOSIS — F32.0 CURRENT MILD EPISODE OF MAJOR DEPRESSIVE DISORDER WITHOUT PRIOR EPISODE (HCC): ICD-10-CM

## 2020-02-18 RX ORDER — DULOXETIN HYDROCHLORIDE 20 MG/1
CAPSULE, DELAYED RELEASE ORAL
Qty: 30 CAP | Refills: 8 | Status: SHIPPED | OUTPATIENT
Start: 2020-02-18

## 2020-02-20 ENCOUNTER — OFFICE VISIT (OUTPATIENT)
Dept: MEDICAL GROUP | Age: 60
End: 2020-02-20
Payer: COMMERCIAL

## 2020-02-20 VITALS
SYSTOLIC BLOOD PRESSURE: 152 MMHG | WEIGHT: 166.8 LBS | DIASTOLIC BLOOD PRESSURE: 68 MMHG | HEIGHT: 63 IN | HEART RATE: 81 BPM | BODY MASS INDEX: 29.55 KG/M2 | TEMPERATURE: 97.8 F | OXYGEN SATURATION: 97 %

## 2020-02-20 DIAGNOSIS — H66.003 ACUTE SUPPURATIVE OTITIS MEDIA OF BOTH EARS WITHOUT SPONTANEOUS RUPTURE OF TYMPANIC MEMBRANES, RECURRENCE NOT SPECIFIED: ICD-10-CM

## 2020-02-20 DIAGNOSIS — F32.0 CURRENT MILD EPISODE OF MAJOR DEPRESSIVE DISORDER WITHOUT PRIOR EPISODE (HCC): ICD-10-CM

## 2020-02-20 DIAGNOSIS — G89.29 CHRONIC RIGHT SHOULDER PAIN: ICD-10-CM

## 2020-02-20 DIAGNOSIS — Z23 NEED FOR VACCINATION: Primary | ICD-10-CM

## 2020-02-20 DIAGNOSIS — M25.511 CHRONIC RIGHT SHOULDER PAIN: ICD-10-CM

## 2020-02-20 PROCEDURE — 90471 IMMUNIZATION ADMIN: CPT | Performed by: FAMILY MEDICINE

## 2020-02-20 PROCEDURE — 90686 IIV4 VACC NO PRSV 0.5 ML IM: CPT | Performed by: FAMILY MEDICINE

## 2020-02-20 PROCEDURE — 99214 OFFICE O/P EST MOD 30 MIN: CPT | Mod: 25 | Performed by: FAMILY MEDICINE

## 2020-02-20 RX ORDER — NAPROXEN 500 MG/1
500 TABLET ORAL 2 TIMES DAILY WITH MEALS
Qty: 60 TAB | Refills: 0 | Status: SHIPPED | OUTPATIENT
Start: 2020-02-20 | End: 2020-03-16

## 2020-02-20 RX ORDER — DIAZEPAM 5 MG/1
TABLET ORAL
Qty: 2 TAB | Refills: 0 | Status: SHIPPED | OUTPATIENT
Start: 2020-02-20 | End: 2020-04-20

## 2020-02-20 RX ORDER — PREDNISONE 20 MG/1
TABLET ORAL
Qty: 12 TAB | Refills: 0 | Status: SHIPPED | OUTPATIENT
Start: 2020-02-20

## 2020-02-20 RX ORDER — AMOXICILLIN 875 MG/1
875 TABLET, COATED ORAL 2 TIMES DAILY
Qty: 14 TAB | Refills: 0 | Status: SHIPPED | OUTPATIENT
Start: 2020-02-20 | End: 2020-06-11

## 2020-02-20 RX ORDER — DULOXETIN HYDROCHLORIDE 60 MG/1
60 CAPSULE, DELAYED RELEASE ORAL DAILY
Qty: 60 CAP | Refills: 0 | Status: SHIPPED | OUTPATIENT
Start: 2020-02-20 | End: 2020-04-16

## 2020-02-20 ASSESSMENT — PATIENT HEALTH QUESTIONNAIRE - PHQ9
5. POOR APPETITE OR OVEREATING: NEARLY EVERY DAY
SUM OF ALL RESPONSES TO PHQ9 QUESTIONS 1 AND 2: 2
9. THOUGHTS THAT YOU WOULD BE BETTER OFF DEAD, OR OF HURTING YOURSELF: NOT AT ALL
4. FEELING TIRED OR HAVING LITTLE ENERGY: MORE THAN HALF THE DAYS
3. TROUBLE FALLING OR STAYING ASLEEP OR SLEEPING TOO MUCH: NEARLY EVERY DAY
1. LITTLE INTEREST OR PLEASURE IN DOING THINGS: NOT AT ALL
SUM OF ALL RESPONSES TO PHQ9 QUESTIONS 1 AND 2: 3
7. TROUBLE CONCENTRATING ON THINGS, SUCH AS READING THE NEWSPAPER OR WATCHING TELEVISION: SEVERAL DAYS
2. FEELING DOWN, DEPRESSED, IRRITABLE, OR HOPELESS: NEARLY EVERY DAY
9. THOUGHTS THAT YOU WOULD BE BETTER OFF DEAD, OR OF HURTING YOURSELF: NOT AT ALL
8. MOVING OR SPEAKING SO SLOWLY THAT OTHER PEOPLE COULD HAVE NOTICED. OR THE OPPOSITE, BEING SO FIGETY OR RESTLESS THAT YOU HAVE BEEN MOVING AROUND A LOT MORE THAN USUAL: NOT AT ALL
2. FEELING DOWN, DEPRESSED, IRRITABLE, OR HOPELESS: MORE THAN HALF THE DAYS
8. MOVING OR SPEAKING SO SLOWLY THAT OTHER PEOPLE COULD HAVE NOTICED. OR THE OPPOSITE, BEING SO FIGETY OR RESTLESS THAT YOU HAVE BEEN MOVING AROUND A LOT MORE THAN USUAL: NOT AT ALL
5. POOR APPETITE OR OVEREATING: NEARLY EVERY DAY
3. TROUBLE FALLING OR STAYING ASLEEP OR SLEEPING TOO MUCH: NOT AT ALL
6. FEELING BAD ABOUT YOURSELF - OR THAT YOU ARE A FAILURE OR HAVE LET YOURSELF OR YOUR FAMILY DOWN: NOT AL ALL
4. FEELING TIRED OR HAVING LITTLE ENERGY: SEVERAL DAYS
SUM OF ALL RESPONSES TO PHQ QUESTIONS 1-9: 12

## 2020-02-20 NOTE — PROGRESS NOTES
This medical record contains text that has been entered with the assistance of computer voice recognition and dictation software.  Therefore, it may contain unintended errors in text, spelling, punctuation, or grammar    Chief Complaint   Patient presents with   • Arm Pain     right arm    • Body Aches   • Anxiety         Inessa Mcintyre is a 59 y.o. female here evaluation and management of: right shoulder and right arm pain.    HPI:          1. Acute suppurative otitis media of both ears without spontaneous rupture of tympanic membranes, recurrence not specified  New Problem    Patient is complaining of a buzzing in her left ear. She was previously prescribed antibiotics for this in August 2018, however this was not covered by her insurance. She is having a repeat of this problem and would like to have it evaluated.        2. Chronic right shoulder pain  Chronic Problem    Patient is here with complaints of right shoulder and right upper arm pain. She states that the pain has been present for the last year however she is able to alleviate it with use of a heating pad. Her pain is worsened with certain movements and range of motion with her right arm and shoulder. Recently she also started to have a feeling of numbness to the right arm and shoulder and thus has come here to have the area evaluated.    3. Current mild episode of major depressive disorder without prior episode (HCC)  Chronic Problem    Patient is taking duloxetine 20 mg for this problem however states that this is not providing any improvement in her mental state and she still feels depressed. She is requesting to either start a new medication or have her dose increased.      4. Need for vaccination  Patient is due for her influenza and zoster vaccinations.        Current medicines (including changes today)  Current Outpatient Medications   Medication Sig Dispense Refill   • naproxen (NAPROSYN) 500 MG Tab Take 1 Tab by mouth 2 times a day, with meals.  60 Tab 0   • diazePAM (VALIUM) 5 MG Tab Take 1-2 tabs one hour prior to MRI 2 Tab 0   • DULoxetine (CYMBALTA) 60 MG Cap DR Particles delayed-release capsule Take 1 Cap by mouth every day. 60 Cap 0   • amoxicillin (AMOXIL) 875 MG tablet Take 1 Tab by mouth 2 times a day. 14 Tab 0   • predniSONE (DELTASONE) 20 MG Tab Take 3 tabs po for 2 days then 2 tabs for 2 days then 1 for 2 days 12 Tab 0   • DULoxetine (CYMBALTA) 20 MG Cap DR Particles TAKE 1 CAPSULE BY MOUTH EVERY DAY 30 Cap 8   • Ibuprofen (ADVIL) 200 MG Cap Take  by mouth.     • Multiple Vitamin (MULTI-VITAMIN DAILY PO) Take  by mouth.     • glucosamine Sulfate 500 MG Cap Take 500 mg by mouth 3 times a day, with meals.     • predniSONE (DELTASONE) 20 MG Tab Take 3 tabs po for 2 days then 2 tabs for 2 days then 1 for 2 days 12 Tab 0   • naproxen (NAPROSYN) 500 MG Tab Take 1 Tab by mouth 2 times a day, with meals. (Patient not taking: Reported on 2020) 60 Tab 0     No current facility-administered medications for this visit.      She  has a past medical history of Diabetes (HCC).  She  has a past surgical history that includes abdominal hysterectomy total.  Social History     Tobacco Use   • Smoking status: Never Smoker   • Smokeless tobacco: Never Used   Substance Use Topics   • Alcohol use: No     Comment: very rarely   • Drug use: No     Social History     Social History Narrative   • Not on file     Family History   Problem Relation Age of Onset   • Hypertension Mother    • Alzheimer's Disease Mother    • Alcohol/Drug Father         ETOH   • Cancer Brother 70        colon      Family Status   Relation Name Status   • Mo     • Fa     • Sis  Alive   • Bro     • Sis  Alive   • Bro  Alive   • Bro  Alive   • Bro  Alive   • Bro  Alive   • Bro  Alive   • Bro  Alive         ROS    Please see hpi     All other systems reviewed and are negative     Objective:     /68 (BP Location: Left arm, Patient Position: Sitting, BP Cuff Size:  "Adult)   Pulse 81   Temp 36.6 °C (97.8 °F) (Temporal)   Ht 1.6 m (5' 3\")   Wt 75.7 kg (166 lb 12.8 oz)   SpO2 97%  Body mass index is 29.55 kg/m².  Physical Exam:    Constitutional: Alert, no distress.  Skin: Warm, Dry  Eye: Equal, round and reactive, conjunctiva clear, lids normal.  ENMT: Lips without lesions, good dentition, oropharynx clear. Left TM is erythematous and bulging with loss of landmarks, Right TM normal  Neck: Trachea midline, no masses, no thyromegaly. No cervical or supraclavicular lymphadenopathy.  Respiratory: Unlabored respiratory effort, lungs clear to auscultation, no wheezes, no ronchi.  Cardiovascular: Normal S1, S2, no murmur, no edema.  Abdomen: Soft, non-tender, no masses, no hepatosplenomegaly.  Psych: Alert and oriented x3, normal affect and mood.    Shoulder -- LEFT    \"NFL touchdown sign\" test normal  NORMAL Apley scratch test NON tender to  direct palpation, NO asymmetry, muscle atrophy, or abnormal motion on direct inspection,  2+ bilateral radial pulses, intact sensation to light touch, vibration,    Normal \"empty can\", normal push-off , negative crossarm test negative spurlings, mild PAIN with Yergason, negative sulcus sign, Positive Neers sign, Positive hawkings test; positive weakness on external rotation, positive painful arc sign, positive Jobs test    Assessment and Plan:   The following treatment plan was discussed    1. Current mild episode of major depressive disorder without prior episode (HCC)  Patient will start discontinue her current dose of duloxetine and start taking a higher dose as prescribed.  - DULoxetine (CYMBALTA) 60 MG Cap DR Particles delayed-release capsule; Take 1 Cap by mouth every day.  Dispense: 60 Cap; Refill: 0        2. Chronic right shoulder pain  Physical exam consistent with rotator cuff strain/tear.  We will proceed with MRI as well as physical therapy  NSAIDs and ice  - MR-SHOULDER-WITH & W/O RIGHT; Future  - REFERRAL TO PHYSICAL THERAPY " Reason for Therapy: Eval/Treat/Report  - naproxen (NAPROSYN) 500 MG Tab; Take 1 Tab by mouth 2 times a day, with meals.  Dispense: 60 Tab; Refill: 0  - diazePAM (VALIUM) 5 MG Tab; Take 1-2 tabs one hour prior to MRI  Dispense: 2 Tab; Refill: 0    3. Current mild episode of major depressive disorder without prior episode (HCC)  Patient will start discontinue her current dose of duloxetine and start taking a higher dose as prescribed.  - DULoxetine (CYMBALTA) 60 MG Cap DR Particles delayed-release capsule; Take 1 Cap by mouth every day.  Dispense: 60 Cap; Refill: 0      3. Need for vaccination  Influenza vaccine was given today after reviewing risks and benefits as well as side effects of vaccine.  Patient will follow up with her pharmacy to get her zoster vaccination.  - Influenza Vaccine Quad Injection (PF)        4. Acute suppurative otitis media of both ears without spontaneous rupture of tympanic membranes, recurrence not specified      - amoxicillin (AMOXIL) 875 MG tablet; Take 1 Tab by mouth 2 times a day.  Dispense: 14 Tab; Refill: 0  - predniSONE (DELTASONE) 20 MG Tab; Take 3 tabs po for 2 days then 2 tabs for 2 days then 1 for 2 days  Dispense: 12 Tab; Refill: 0        Followup: Return in about 2 months (around 4/20/2020) for Reevaluation, labs.      Once again this medical record contains text that has been entered with the assistance of computer voice recognition, dictation software, and medical scribes.  Therefore, it may contain unintended errors in text, spelling, punctuation, or grammar.    IHerson (Scribe), am scribing for, and in the presence of, Hamilton Mccracken M.D.    Electronically signed by: Herson Aguilar (Scribe), 2/20/2020     Hamilton TURPIN M.D. personally performed the services described in this documentation, as scribed by Herson Aguilar in my presence, and it is both accurate and complete.

## 2020-02-20 NOTE — LETTER
February 20, 2020       Patient: Inessa Mcintyre   YOB: 1960   Date of Visit: 2/20/2020         To Whom It May Concern:    It is my medical opinion that Inessa Mcintyre should not lift anything over 5 lbs for 30     days.  If you have any questions or concerns, please don't hesitate to call     [phone]      Sincerely,      Hamilton Mccracken M.D.  Electronically Signed

## 2020-03-14 DIAGNOSIS — M25.511 CHRONIC RIGHT SHOULDER PAIN: ICD-10-CM

## 2020-03-14 DIAGNOSIS — G89.29 CHRONIC RIGHT SHOULDER PAIN: ICD-10-CM

## 2020-03-18 RX ORDER — NAPROXEN 500 MG/1
TABLET ORAL
Qty: 60 TAB | Refills: 0 | Status: SHIPPED | OUTPATIENT
Start: 2020-03-18 | End: 2020-04-16

## 2020-04-16 DIAGNOSIS — G89.29 CHRONIC RIGHT SHOULDER PAIN: ICD-10-CM

## 2020-04-16 DIAGNOSIS — M25.511 CHRONIC RIGHT SHOULDER PAIN: ICD-10-CM

## 2020-04-16 DIAGNOSIS — F32.0 CURRENT MILD EPISODE OF MAJOR DEPRESSIVE DISORDER WITHOUT PRIOR EPISODE (HCC): ICD-10-CM

## 2020-04-16 RX ORDER — DULOXETIN HYDROCHLORIDE 60 MG/1
CAPSULE, DELAYED RELEASE ORAL
Qty: 30 CAP | Refills: 0 | Status: SHIPPED | OUTPATIENT
Start: 2020-04-16 | End: 2020-06-03

## 2020-04-16 RX ORDER — NAPROXEN 500 MG/1
TABLET ORAL
Qty: 60 TAB | Refills: 0 | Status: SHIPPED | OUTPATIENT
Start: 2020-04-16

## 2020-05-30 DIAGNOSIS — F32.0 CURRENT MILD EPISODE OF MAJOR DEPRESSIVE DISORDER WITHOUT PRIOR EPISODE (HCC): ICD-10-CM

## 2020-06-03 RX ORDER — DULOXETIN HYDROCHLORIDE 60 MG/1
CAPSULE, DELAYED RELEASE ORAL
Qty: 30 CAP | Refills: 0 | Status: SHIPPED | OUTPATIENT
Start: 2020-06-03 | End: 2020-06-30

## 2020-06-09 ENCOUNTER — TELEPHONE (OUTPATIENT)
Dept: MEDICAL GROUP | Age: 60
End: 2020-06-09

## 2020-06-09 NOTE — TELEPHONE ENCOUNTER
VOICEMAIL  1. Caller Name: Inessa Mcintyre                        Call Back Number: [phone]    2. Message: Patient would like a note for work stating that she is high risk and excusing her from returning to work this Friday.    3. Patient approves office to leave a detailed voicemail/MyChart message: yes    Please Advise

## 2020-06-10 NOTE — TELEPHONE ENCOUNTER
Phone Number Called: [phone] (home)     Call outcome: Did not leave a detailed message. Requested patient to call back.    Message: lvm for pt to call back to schedule a virtual visit

## 2020-06-11 ENCOUNTER — TELEMEDICINE (OUTPATIENT)
Dept: MEDICAL GROUP | Age: 60
End: 2020-06-11
Payer: COMMERCIAL

## 2020-06-11 VITALS — RESPIRATION RATE: 12 BRPM | HEIGHT: 63 IN | BODY MASS INDEX: 27.82 KG/M2 | TEMPERATURE: 97.5 F | WEIGHT: 157 LBS

## 2020-06-11 DIAGNOSIS — Z02.9 ADMINISTRATIVE ENCOUNTER: ICD-10-CM

## 2020-06-11 PROCEDURE — 7101 PR PHYSICAL: Performed by: FAMILY MEDICINE

## 2020-06-11 ASSESSMENT — FIBROSIS 4 INDEX: FIB4 SCORE: 0.79

## 2020-06-11 NOTE — TELEPHONE ENCOUNTER
Phone Number Called: [phone]    Call outcome: Spoke to patient regarding message below.    Message: Patient has an appointment today @ 2:00pm

## 2020-06-11 NOTE — PROGRESS NOTES
Telemedicine Visit: Established Patient     This encounter was conducted via APROOFED.   Verbal consent was obtained. Patient's identity was verified.    Subjective:   CC:   Inessa Mcintyre is a 59 y.o. female presenting for evaluation and management of:    1. Administrative encounter    The patient is a 59-year-old female who presents to clinic with a chief complaint of very nervous about returning to work.  She works at the  at the [employer].  States that she is starting work tomorrow and she is very afraid of the coronavirus.  They will not be fully staffed so she will be responsible for taking people's temperatures and going to peoples rooms which she feels will put her at more risk.  She would like a delay in her return for another month.  She denies any fevers chills night sweats shortness of breath or any new rashes.  States they get gas from all over [place] and she is very concerned about COVID-19 transmission.    ROS   Denies any recent fevers or chills. No nausea or vomiting. No chest pains or shortness of breath.     No Known Allergies    Current medicines (including changes today)  Current Outpatient Medications   Medication Sig Dispense Refill   • DULoxetine (CYMBALTA) 60 MG Cap DR Particles delayed-release capsule TAKE 1 CAPSULE BY MOUTH EVERY DAY 30 Cap 0   • naproxen (NAPROSYN) 500 MG Tab TAKE 1 TABLET BY MOUTH TWICE A DAY WITH MEALS 60 Tab 0   • predniSONE (DELTASONE) 20 MG Tab Take 3 tabs po for 2 days then 2 tabs for 2 days then 1 for 2 days 12 Tab 0   • DULoxetine (CYMBALTA) 20 MG Cap DR Particles TAKE 1 CAPSULE BY MOUTH EVERY DAY 30 Cap 8   • predniSONE (DELTASONE) 20 MG Tab Take 3 tabs po for 2 days then 2 tabs for 2 days then 1 for 2 days 12 Tab 0   • Ibuprofen (ADVIL) 200 MG Cap Take  by mouth.     • Multiple Vitamin (MULTI-VITAMIN DAILY PO) Take  by mouth.     • glucosamine Sulfate 500 MG Cap Take 500 mg by mouth 3 times a day, with meals.       No current  "facility-administered medications for this visit.        Patient Active Problem List    Diagnosis Date Noted   • Chronic right shoulder pain 02/20/2020   • Bilateral acute suppurative otitis media 08/31/2018   • Preventative health care 08/02/2018   • Administrative encounter 08/02/2018   • Pure hypercholesterolemia 08/02/2018   • Major depressive disorder 08/02/2018   • Need for vaccination 08/02/2018   • Atypical chest pain 11/11/2014       Family History   Problem Relation Age of Onset   • Hypertension Mother    • Alzheimer's Disease Mother    • Alcohol/Drug Father         ETOH   • Cancer Brother 70        colon        She  has a past medical history of Diabetes (HCC).  She  has a past surgical history that includes abdominal hysterectomy total.       Objective:   Temp 36.4 °C (97.5 °F) (Temporal)   Resp 12   Ht 1.6 m (5' 3\") Comment: pt stated  Wt 71.2 kg (157 lb) Comment: pt stated  LMP  (LMP Unknown)   Breastfeeding No   BMI 27.81 kg/m²     Physical Exam:  Constitutional: Alert, no distress, well-groomed.  Skin: No rashes in visible areas.  Eye: Round. Conjunctiva clear, lids normal. No icterus.   ENMT: Lips pink without lesions, good dentition, moist mucous membranes. Phonation normal.  Neck: No masses, no thyromegaly. Moves freely without pain.  CV: Pulse as reported by patient  Respiratory: Unlabored respiratory effort, no cough or audible wheeze  Psych: Alert and oriented x3, normal affect and mood.       Assessment and Plan:   The following treatment plan was discussed:     1. Administrative encounter  I did write a letter extending her leave until July 11.      Follow-up: Return in about 4 weeks (around 7/9/2020) for Reevaluation.         "

## 2020-06-11 NOTE — LETTER
June 11, 2020       Patient: Inessa Mcintyre   YOB: 1960   Date of Visit: 6/11/2020         To Whom It May Concern:    In my medical opinion, I recommend that Inessa Mcintyre be excused from work until     11/July/2020    If you have any questions or concerns, please don't hesitate to call [phone]        Sincerely,        Hamilton Mccracken M.D.  Electronically Signed

## 2020-06-29 DIAGNOSIS — F32.0 CURRENT MILD EPISODE OF MAJOR DEPRESSIVE DISORDER WITHOUT PRIOR EPISODE (HCC): ICD-10-CM

## 2020-06-30 RX ORDER — DULOXETIN HYDROCHLORIDE 60 MG/1
CAPSULE, DELAYED RELEASE ORAL
Qty: 30 CAP | Refills: 0 | Status: SHIPPED | OUTPATIENT
Start: 2020-06-30 | End: 2020-07-30

## 2020-07-30 DIAGNOSIS — F32.0 CURRENT MILD EPISODE OF MAJOR DEPRESSIVE DISORDER WITHOUT PRIOR EPISODE (HCC): ICD-10-CM

## 2020-07-30 RX ORDER — DULOXETIN HYDROCHLORIDE 60 MG/1
CAPSULE, DELAYED RELEASE ORAL
Qty: 30 CAP | Refills: 0 | Status: SHIPPED | OUTPATIENT
Start: 2020-07-30 | End: 2020-08-27

## 2020-08-27 DIAGNOSIS — F32.0 CURRENT MILD EPISODE OF MAJOR DEPRESSIVE DISORDER WITHOUT PRIOR EPISODE (HCC): ICD-10-CM

## 2020-08-27 RX ORDER — DULOXETIN HYDROCHLORIDE 60 MG/1
CAPSULE, DELAYED RELEASE ORAL
Qty: 30 CAP | Refills: 0 | Status: SHIPPED | OUTPATIENT
Start: 2020-08-27 | End: 2020-10-14 | Stop reason: SDUPTHER

## 2020-09-02 ENCOUNTER — OFFICE VISIT (OUTPATIENT)
Dept: MEDICAL GROUP | Age: 60
End: 2020-09-02
Payer: COMMERCIAL

## 2020-09-02 VITALS
OXYGEN SATURATION: 98 % | BODY MASS INDEX: 27.82 KG/M2 | WEIGHT: 157 LBS | HEART RATE: 82 BPM | SYSTOLIC BLOOD PRESSURE: 130 MMHG | DIASTOLIC BLOOD PRESSURE: 80 MMHG | HEIGHT: 63 IN | TEMPERATURE: 97.9 F

## 2020-09-02 DIAGNOSIS — M79.645 PAIN OF FINGER OF LEFT HAND: ICD-10-CM

## 2020-09-02 DIAGNOSIS — M65.4 DE QUERVAIN'S TENOSYNOVITIS: ICD-10-CM

## 2020-09-02 PROCEDURE — 99214 OFFICE O/P EST MOD 30 MIN: CPT | Performed by: FAMILY MEDICINE

## 2020-09-02 NOTE — PROGRESS NOTES
This medical record contains text that has been entered with the assistance of computer voice recognition and dictation software.  Therefore, it may contain unintended errors in text, spelling, punctuation, or grammar      Chief Complaint   Patient presents with   • Edema     Middle finger of L. Hand pain and hard to bend. R. Hand thumb hurts          Inessa Mcintyre is a 60 y.o. female here evaluation and management of: Finger pain, wrist pain      HPI:     1. Pain of finger of left hand  NEW PROBLEM    The patient is a 60-year-old female who presents to clinic with a chief complaint of left middle finger pain and swelling which has been there for several months.  She denies any trauma that she can remember.  She states sometimes it gets stuck in the flexed position and she has to push it open.  She is having a hard time making a fist as well.  She denies any new rashes no fevers no chills.    2. De Quervain's tenosynovitis  NEW PROBLEM    The patient also complains of wrist pain in the right wrist on the radial aspect.  The patient works at the [employer] in  so she does type a lot.  She describes the pain is a dull ache, 5 out of 10, worse with typing, denies any other trauma.  Denies any fevers or chills no new rashes.  Rest improves the pain sometimes is worse in the morning.  There are no other associated or aggravating features.    Current medicines (including changes today)  Current Outpatient Medications   Medication Sig Dispense Refill   • Misc. Devices Misc Please provide patient with left wrist splint to patient's size of wrist 1 Each 0   • Misc. Devices Misc Please provide patient with middle finger splint 1 Each 2   • DULoxetine (CYMBALTA) 60 MG Cap DR Particles delayed-release capsule TAKE 1 CAPSULE BY MOUTH EVERY DAY 30 Cap 0   • naproxen (NAPROSYN) 500 MG Tab TAKE 1 TABLET BY MOUTH TWICE A DAY WITH MEALS 60 Tab 0   • Ibuprofen (ADVIL) 200 MG Cap Take  by mouth.     • Multiple Vitamin  "(MULTI-VITAMIN DAILY PO) Take  by mouth.     • glucosamine Sulfate 500 MG Cap Take 500 mg by mouth 3 times a day, with meals.     • predniSONE (DELTASONE) 20 MG Tab Take 3 tabs po for 2 days then 2 tabs for 2 days then 1 for 2 days (Patient not taking: Reported on 2020) 12 Tab 0   • DULoxetine (CYMBALTA) 20 MG Cap DR Particles TAKE 1 CAPSULE BY MOUTH EVERY DAY (Patient not taking: Reported on 2020) 30 Cap 8   • predniSONE (DELTASONE) 20 MG Tab Take 3 tabs po for 2 days then 2 tabs for 2 days then 1 for 2 days (Patient not taking: Reported on 2020) 12 Tab 0     No current facility-administered medications for this visit.      She  has a past medical history of Diabetes (HCC).  She  has a past surgical history that includes abdominal hysterectomy total.  Social History     Tobacco Use   • Smoking status: Never Smoker   • Smokeless tobacco: Never Used   Substance Use Topics   • Alcohol use: No     Comment: very rarely   • Drug use: No     Social History     Social History Narrative   • Not on file     Family History   Problem Relation Age of Onset   • Hypertension Mother    • Alzheimer's Disease Mother    • Alcohol/Drug Father         ETOH   • Cancer Brother 70        colon      Family Status   Relation Name Status   • Mo     • Fa     • Sis  Alive   • Bro     • Sis  Alive   • Bro  Alive   • Bro  Alive   • Bro  Alive   • Bro  Alive   • Bro  Alive   • Bro  Alive         ROS    The pertinent  ROS findings can be seen in the HPI above.     All other systems reviewed and are negative     Objective:     /80 (BP Location: Left arm, Patient Position: Sitting, BP Cuff Size: Adult)   Pulse 82   Temp 36.6 °C (97.9 °F) (Temporal)   Ht 1.6 m (5' 3\")   Wt 71.2 kg (157 lb)   SpO2 98%  Body mass index is 27.81 kg/m².      Physical Exam:    Constitutional: Alert, no distress.  Skin: no rashes  Eye: Equal, round and reactive, conjunctiva clear, lids normal.  ENMT: Lips without lesions, good " dentition, oropharynx clear.  Neck: Trachea midline, no masses, no thyromegaly. No cervical or supraclavicular lymphadenopathy.  Respiratory: Unlabored respiratory effort, lungs clear to auscultation, no wheezes, no ronchi.  Cardiovascular: Normal S1, S2, no murmur, no edema  Abdomen: Soft, non-tender, no masses, no hepatosplenomegaly.    Left hand--there is mild swelling on the middle finger, there is a 1 mm nodule at the base of the PIP, she is unable to make a fist    Right Wrist    Normal flexion, normal  extension, Positive Finkelstein test, negative Phalen, negative Tinel, normal shoulder exam, normal elbow exam  Wrist not warm,non tender at anatomical snuff box, normal squeeze test    Assessment and Plan:   The following treatment plan was discussed      1. Pain of finger of left hand    Patient was instructed on activity modification ×2 weeks  Use the brace that  was given in clinic for 2 weeks with activity    NSAIDs when necessary  Ice when necessary and compression    - DX-FINGER(S) 2+ LEFT; Future  - Misc. Devices Misc; Please provide patient with middle finger splint  Dispense: 1 Each; Refill: 2      2. De Quervain's tenosynovitis    Patient was given instructions to ice  Use the wrist splint for 2 weeks especially when sleeping  If pain persist we will proceed with an intra-articular injection      - Misc. Devices Misc; Please provide patient with left wrist splint to patient's size of wrist  Dispense: 1 Each; Refill: 0    Instructed to Follow up in clinic or ER for worsening symptoms, difficulty breathing, lack of expected recovery, or should new symptoms or problems arise.    Followup: Return in about 3 months (around 12/2/2020) for Reevaluation.       Once again this medical record contains text that has been entered with the assistance of computer voice recognition and dictation software.  Therefore, it may contain unintended errors in text, spelling, punctuation, or grammar

## 2020-10-14 ENCOUNTER — APPOINTMENT (OUTPATIENT)
Dept: RADIOLOGY | Facility: MEDICAL CENTER | Age: 60
End: 2020-10-14
Payer: COMMERCIAL

## 2020-10-14 ENCOUNTER — PATIENT MESSAGE (OUTPATIENT)
Dept: MEDICAL GROUP | Age: 60
End: 2020-10-14

## 2020-10-14 ENCOUNTER — HOSPITAL ENCOUNTER (EMERGENCY)
Facility: MEDICAL CENTER | Age: 60
End: 2020-10-14
Attending: EMERGENCY MEDICINE | Admitting: EMERGENCY MEDICINE
Payer: COMMERCIAL

## 2020-10-14 VITALS
HEART RATE: 75 BPM | BODY MASS INDEX: 28.2 KG/M2 | HEIGHT: 63 IN | DIASTOLIC BLOOD PRESSURE: 67 MMHG | SYSTOLIC BLOOD PRESSURE: 146 MMHG | TEMPERATURE: 97.2 F | OXYGEN SATURATION: 97 % | RESPIRATION RATE: 20 BRPM | WEIGHT: 159.17 LBS

## 2020-10-14 DIAGNOSIS — F32.0 CURRENT MILD EPISODE OF MAJOR DEPRESSIVE DISORDER WITHOUT PRIOR EPISODE (HCC): ICD-10-CM

## 2020-10-14 DIAGNOSIS — R07.89 CHEST WALL PAIN: ICD-10-CM

## 2020-10-14 DIAGNOSIS — R07.89 ATYPICAL CHEST PAIN: ICD-10-CM

## 2020-10-14 LAB
ALBUMIN SERPL BCP-MCNC: 4.5 G/DL (ref 3.2–4.9)
ALBUMIN/GLOB SERPL: 1.6 G/DL
ALP SERPL-CCNC: 121 U/L (ref 30–99)
ALT SERPL-CCNC: 25 U/L (ref 2–50)
ANION GAP SERPL CALC-SCNC: 11 MMOL/L (ref 7–16)
AST SERPL-CCNC: 17 U/L (ref 12–45)
BASOPHILS # BLD AUTO: 1 % (ref 0–1.8)
BASOPHILS # BLD: 0.08 K/UL (ref 0–0.12)
BILIRUB SERPL-MCNC: 0.2 MG/DL (ref 0.1–1.5)
BUN SERPL-MCNC: 14 MG/DL (ref 8–22)
CALCIUM SERPL-MCNC: 9.5 MG/DL (ref 8.5–10.5)
CHLORIDE SERPL-SCNC: 101 MMOL/L (ref 96–112)
CO2 SERPL-SCNC: 26 MMOL/L (ref 20–33)
CREAT SERPL-MCNC: 0.81 MG/DL (ref 0.5–1.4)
EKG IMPRESSION: NORMAL
EOSINOPHIL # BLD AUTO: 0.28 K/UL (ref 0–0.51)
EOSINOPHIL NFR BLD: 3.4 % (ref 0–6.9)
ERYTHROCYTE [DISTWIDTH] IN BLOOD BY AUTOMATED COUNT: 42.8 FL (ref 35.9–50)
GLOBULIN SER CALC-MCNC: 2.9 G/DL (ref 1.9–3.5)
GLUCOSE SERPL-MCNC: 105 MG/DL (ref 65–99)
HCT VFR BLD AUTO: 44 % (ref 37–47)
HGB BLD-MCNC: 14.3 G/DL (ref 12–16)
IMM GRANULOCYTES # BLD AUTO: 0.03 K/UL (ref 0–0.11)
IMM GRANULOCYTES NFR BLD AUTO: 0.4 % (ref 0–0.9)
LYMPHOCYTES # BLD AUTO: 2.34 K/UL (ref 1–4.8)
LYMPHOCYTES NFR BLD: 28.4 % (ref 22–41)
MCH RBC QN AUTO: 28.9 PG (ref 27–33)
MCHC RBC AUTO-ENTMCNC: 32.5 G/DL (ref 33.6–35)
MCV RBC AUTO: 89.1 FL (ref 81.4–97.8)
MONOCYTES # BLD AUTO: 0.46 K/UL (ref 0–0.85)
MONOCYTES NFR BLD AUTO: 5.6 % (ref 0–13.4)
NEUTROPHILS # BLD AUTO: 5.05 K/UL (ref 2–7.15)
NEUTROPHILS NFR BLD: 61.2 % (ref 44–72)
NRBC # BLD AUTO: 0 K/UL
NRBC BLD-RTO: 0 /100 WBC
PLATELET # BLD AUTO: 328 K/UL (ref 164–446)
PMV BLD AUTO: 10 FL (ref 9–12.9)
POTASSIUM SERPL-SCNC: 4.1 MMOL/L (ref 3.6–5.5)
PROT SERPL-MCNC: 7.4 G/DL (ref 6–8.2)
RBC # BLD AUTO: 4.94 M/UL (ref 4.2–5.4)
SODIUM SERPL-SCNC: 138 MMOL/L (ref 135–145)
TROPONIN T SERPL-MCNC: <6 NG/L (ref 6–19)
WBC # BLD AUTO: 8.2 K/UL (ref 4.8–10.8)

## 2020-10-14 PROCEDURE — 93005 ELECTROCARDIOGRAM TRACING: CPT

## 2020-10-14 PROCEDURE — 71045 X-RAY EXAM CHEST 1 VIEW: CPT

## 2020-10-14 PROCEDURE — A9270 NON-COVERED ITEM OR SERVICE: HCPCS | Performed by: EMERGENCY MEDICINE

## 2020-10-14 PROCEDURE — 700102 HCHG RX REV CODE 250 W/ 637 OVERRIDE(OP): Performed by: EMERGENCY MEDICINE

## 2020-10-14 PROCEDURE — 85025 COMPLETE CBC W/AUTO DIFF WBC: CPT

## 2020-10-14 PROCEDURE — 99283 EMERGENCY DEPT VISIT LOW MDM: CPT

## 2020-10-14 PROCEDURE — 80053 COMPREHEN METABOLIC PANEL: CPT

## 2020-10-14 PROCEDURE — 93005 ELECTROCARDIOGRAM TRACING: CPT | Performed by: EMERGENCY MEDICINE

## 2020-10-14 PROCEDURE — 84484 ASSAY OF TROPONIN QUANT: CPT

## 2020-10-14 RX ORDER — ACETAMINOPHEN 325 MG/1
650 TABLET ORAL ONCE
Status: COMPLETED | OUTPATIENT
Start: 2020-10-14 | End: 2020-10-14

## 2020-10-14 RX ORDER — IBUPROFEN 600 MG/1
600 TABLET ORAL ONCE
Status: COMPLETED | OUTPATIENT
Start: 2020-10-14 | End: 2020-10-14

## 2020-10-14 RX ORDER — DULOXETIN HYDROCHLORIDE 60 MG/1
60 CAPSULE, DELAYED RELEASE ORAL
Qty: 30 CAP | Refills: 0 | Status: SHIPPED | OUTPATIENT
Start: 2020-10-14 | End: 2020-11-25 | Stop reason: SDUPTHER

## 2020-10-14 RX ADMIN — IBUPROFEN 600 MG: 600 TABLET, FILM COATED ORAL at 15:39

## 2020-10-14 RX ADMIN — ACETAMINOPHEN 650 MG: 325 TABLET, FILM COATED ORAL at 15:39

## 2020-10-14 ASSESSMENT — LIFESTYLE VARIABLES: DO YOU DRINK ALCOHOL: NO

## 2020-10-14 NOTE — ED NOTES
Pt Given discharge instructions/ prescriptions/ home care instructions, Pt verbalized understanding of instructions given, pt ambulatory to CHARAN samayoa.

## 2020-10-14 NOTE — ED TRIAGE NOTES
Pt ambulated to triage with   Chief Complaint   Patient presents with   • Chest Pain     mid chest, for the last 2 wks, increasing today.       EKG completed.  Pt Informed regarding triage process and verbalized understanding to inform triage tech or RN for any changes in condition. Placed in lobby.

## 2020-10-14 NOTE — ED PROVIDER NOTES
ED Provider Note    Scribed for Rashel Valenzuela M.D. by Terrence Nair. 10/14/2020  2:56 PM    Primary care provider: Hamilton Mccracken M.D.  Means of arrival: Walk in  History obtained from: Patient  History limited by: None    CHIEF COMPLAINT  Chief Complaint   Patient presents with   • Chest Pain     mid chest, for the last 2 wks, increasing today.         ANIVAL Mcintyre is a 60 y.o. female who presents to the Emergency Department for evaluation of constant sternal chest pain onset 2 weeks. She states the chest pain has a quality of pressure. She admits to associated symptoms of shortness of breath and tingling in her back, but denies radiating pain, loss of appetite, decreased fluid intake, rash, loss of smell or loss of taste. She has medicated with 4 Advil daily, with no alleviation of pain. She denies exacerbation with exertion or eating. She denies history of diabetes, hypertension, stroke or heart attack. She denies history of smoking. She denies family history of heart attack.     PPE Note: I personally donned PPE for all patient encounters during this visit, including being clean-shaven with a KN95 mask, gloves, and eye protection.     Scribe remained outside the patient's room and did not have any contact with the patient for the duration of patient encounter.       REVIEW OF SYSTEMS  Pertinent positives include sternal chest pain, shortness of breath (secondary to pain), and tingling in her back.   Pertinent negatives include no radiating pain, loss of appetite, decreased fluid intake, rash, loss of smell or loss of taste.    All other systems reviewed and negative.    PAST MEDICAL HISTORY   None noted    SURGICAL HISTORY   has a past surgical history that includes abdominal hysterectomy total.    SOCIAL HISTORY  Social History     Tobacco Use   • Smoking status: Never Smoker   • Smokeless tobacco: Never Used   Substance Use Topics   • Alcohol use: No     Comment: very rarely   • Drug use: No     "  Social History     Substance and Sexual Activity   Drug Use No       FAMILY HISTORY  Family History   Problem Relation Age of Onset   • Hypertension Mother    • Alzheimer's Disease Mother    • Alcohol/Drug Father         ETOH   • Cancer Brother 70        colon        CURRENT MEDICATIONS  Home Medications     Reviewed by Tracie Blackwood R.N. (Registered Nurse) on 10/14/20 at 1227  Med List Status: Partial   Medication Last Dose Status   DULoxetine (CYMBALTA) 20 MG Cap DR Particles  Active   DULoxetine (CYMBALTA) 60 MG Cap DR Particles delayed-release capsule 10/14/2020 Active   glucosamine Sulfate 500 MG Cap prn Active   Ibuprofen (ADVIL) 200 MG Cap prn Active   Misc. Devices Misc  Active   Misc. Devices Misc  Active   Multiple Vitamin (MULTI-VITAMIN DAILY PO) 10/14/2020 Active   naproxen (NAPROSYN) 500 MG Tab prn Active   predniSONE (DELTASONE) 20 MG Tab  Active   predniSONE (DELTASONE) 20 MG Tab  Active                ALLERGIES  No Known Allergies    PHYSICAL EXAM  VITAL SIGNS: /61   Pulse 65   Temp 36.2 °C (97.2 °F) (Temporal)   Resp 16   Ht 1.6 m (5' 3\")   Wt 72.2 kg (159 lb 2.8 oz)   LMP  (LMP Unknown)   SpO2 96%   BMI 28.20 kg/m²     Constitutional: Well developed, Well nourished, no acute distress, Non-toxic appearance.   HENT: Normocephalic, Atraumatic, Bilateral external ears normal, Oropharynx moist, No oral exudates.   Eyes: PERRLA, EOMI, Conjunctiva normal, No discharge.   Neck: No tenderness, Supple, No stridor.   Lymphatic: No lymphadenopathy noted.   Cardiovascular: Normal heart rate, Normal rhythm.   Thorax & Lungs: Focal tenderness to the xyphoid and inferior sternum. Clear to auscultation bilaterally, No respiratory distress, No wheezing, No crackles.   Abdomen: Soft, No tenderness, No masses, No pulsatile masses.   Skin: Warm, Dry, No erythema, No rash.   Extremities:, No edema No cyanosis.   Musculoskeletal: No tenderness to palpation or major deformities noted.  Intact distal " pulses  Neurologic: Awake, alert. Moves all extremities spontaneously.  Psychiatric: Affect normal, Judgment normal, Mood normal.     LABS  Results for orders placed or performed during the hospital encounter of 10/14/20   CBC with Differential   Result Value Ref Range    WBC 8.2 4.8 - 10.8 K/uL    RBC 4.94 4.20 - 5.40 M/uL    Hemoglobin 14.3 12.0 - 16.0 g/dL    Hematocrit 44.0 37.0 - 47.0 %    MCV 89.1 81.4 - 97.8 fL    MCH 28.9 27.0 - 33.0 pg    MCHC 32.5 (L) 33.6 - 35.0 g/dL    RDW 42.8 35.9 - 50.0 fL    Platelet Count 328 164 - 446 K/uL    MPV 10.0 9.0 - 12.9 fL    Neutrophils-Polys 61.20 44.00 - 72.00 %    Lymphocytes 28.40 22.00 - 41.00 %    Monocytes 5.60 0.00 - 13.40 %    Eosinophils 3.40 0.00 - 6.90 %    Basophils 1.00 0.00 - 1.80 %    Immature Granulocytes 0.40 0.00 - 0.90 %    Nucleated RBC 0.00 /100 WBC    Neutrophils (Absolute) 5.05 2.00 - 7.15 K/uL    Lymphs (Absolute) 2.34 1.00 - 4.80 K/uL    Monos (Absolute) 0.46 0.00 - 0.85 K/uL    Eos (Absolute) 0.28 0.00 - 0.51 K/uL    Baso (Absolute) 0.08 0.00 - 0.12 K/uL    Immature Granulocytes (abs) 0.03 0.00 - 0.11 K/uL    NRBC (Absolute) 0.00 K/uL   Complete Metabolic Panel (CMP)   Result Value Ref Range    Sodium 138 135 - 145 mmol/L    Potassium 4.1 3.6 - 5.5 mmol/L    Chloride 101 96 - 112 mmol/L    Co2 26 20 - 33 mmol/L    Anion Gap 11.0 7.0 - 16.0    Glucose 105 (H) 65 - 99 mg/dL    Bun 14 8 - 22 mg/dL    Creatinine 0.81 0.50 - 1.40 mg/dL    Calcium 9.5 8.5 - 10.5 mg/dL    AST(SGOT) 17 12 - 45 U/L    ALT(SGPT) 25 2 - 50 U/L    Alkaline Phosphatase 121 (H) 30 - 99 U/L    Total Bilirubin 0.2 0.1 - 1.5 mg/dL    Albumin 4.5 3.2 - 4.9 g/dL    Total Protein 7.4 6.0 - 8.2 g/dL    Globulin 2.9 1.9 - 3.5 g/dL    A-G Ratio 1.6 g/dL   Troponin   Result Value Ref Range    Troponin T <6 6 - 19 ng/L   ESTIMATED GFR   Result Value Ref Range    GFR If African American >60 >60 mL/min/1.73 m 2    GFR If Non African American >60 >60 mL/min/1.73 m 2   EKG (NOW)   Result  Value Ref Range    Report       [facility] Emergency Dept.    Test Date:  2020-10-14  Pt Name:    JIN HOGUE       Department: ER  MRN:        [id number]                      Room:  Gender:     Female                       Technician: [id number]  :        1960                   Requested By:ER TRIAGE PROTOCOL  Order #:    [id number]                    Reading MD: PRELA PEACOCK MD    Measurements  Intervals                                Axis  Rate:       72                           P:          1  ID:         [id number]                          QRS:        60  QRSD:       84                           T:          70  QT:         412  QTc:        451    Interpretive Statements  SINUS RHYTHM  BORDERLINE T ABNORMALITIES, ANT-LAT LEADS  Compared to ECG 2016 10:53:43  No significant changes  Electronically Signed On 10- 15:34:06 PDT by PERLA PEACOCK MD          All labs reviewed by me.     12 lead EKG interpreted by me as above.     RADIOLOGY  DX-CHEST-PORTABLE (1 VIEW)   Final Result      1.  There is no acute cardiopulmonary process.        The radiologist's interpretation of all radiological studies have been reviewed by me.      COURSE & MEDICAL DECISION MAKING  Pertinent Labs & Imaging studies reviewed. (See chart for details)    I reviewed the patient's medical records which showed the patient has history of hysterectomy. She had a cardiac stress test and echocardiogram 18.     2:56 PM - Patient seen and examined at bedside. I informed the patient of my plan to run diagnostic studies to evaluate their symptoms including imaging and labs. Patient verbalizes understanding and support with my plan of care. Ordered DX-Chest, EKG, CBC with diff, CMP, Troponin to evaluate her symptoms. The differential diagnoses include but are not limited to: Chest wall pain, ACS, GERD.     I discussed the patient's diagnostic study results which show no acute cardiopulmonary  process. Discussed pain is likely related to chest wall pain. I discussed plan for discharge and follow up with Cardiology as outlined below. She will be treated with Motrin 600 mg tab and Tylenol 650 mg tab prior to discharge. The patient verbalizes they feel comfortable going home. The patient is stable for discharge at this time and will return for any new or worsening symptoms. Patient verbalizes understanding and support with my plan for discharge.      Heart Score: 1    Decision Making:  Patient with nonspecific central chest pain, she does have tenderness palpation on the anterior chest wall, troponin was negative with continued symptoms, the patient's heart score is 1, believe is likely musculoskeletal in nature have the patient take Tylenol ibuprofen, I have referred the patient to cardiology urgently for continuous chest pain work-up, discussed with her that if her chest pain worsens, diaphoresis, shortness of breath, the patient needs to return to the emergency department.    The patient will return for new or worsening symptoms and is stable at the time of discharge.    The patient is referred to a primary physician for blood pressure management, diabetic screening, and for all other preventative health concerns.    DISPOSITION:  Patient will be discharged home in stable condition.    FOLLOW UP:  [facility], Emergency Dept  [address] [address] [phone]    If symptoms worsen    [facility]  [address], [address]  [facility] [address] [phone]        FINAL IMPRESSION  1. Chest wall pain    2. Atypical chest pain        Terrence TURPIN (Jann), am scribing for, and in the presence of, Rashel Valenzuela M.D..    Electronically signed by: Terrence Nair (Jann), 10/14/2020    Rashel TURPIN M.D. personally performed the services described in this documentation, as scribed by Terrence Nair in my presence, and it is both accurate  and complete.    The note accurately reflects work and decisions made by me.  Rashel Valenzuela M.D.  10/14/2020  5:51 PM

## 2020-10-14 NOTE — ED NOTES
Pt ambulated to room without assistance Strong gate noted. Pt changing into gown, Ready for eval.

## 2020-11-25 ENCOUNTER — PATIENT MESSAGE (OUTPATIENT)
Dept: MEDICAL GROUP | Age: 60
End: 2020-11-25

## 2020-11-25 DIAGNOSIS — F32.0 CURRENT MILD EPISODE OF MAJOR DEPRESSIVE DISORDER WITHOUT PRIOR EPISODE (HCC): ICD-10-CM

## 2020-11-25 NOTE — PATIENT COMMUNICATION
Received request via: Patient    Was the patient seen in the last year in this department? Yes    Does the patient have an active prescription (recently filled or refills available) for medication(s) requested? Filled 11/14/2020

## 2020-11-30 RX ORDER — DULOXETIN HYDROCHLORIDE 60 MG/1
60 CAPSULE, DELAYED RELEASE ORAL
Qty: 30 CAP | Refills: 0 | Status: SHIPPED | OUTPATIENT
Start: 2020-11-30 | End: 2020-12-29

## 2020-12-26 DIAGNOSIS — F32.0 CURRENT MILD EPISODE OF MAJOR DEPRESSIVE DISORDER WITHOUT PRIOR EPISODE (HCC): ICD-10-CM

## 2020-12-29 RX ORDER — DULOXETIN HYDROCHLORIDE 60 MG/1
CAPSULE, DELAYED RELEASE ORAL
Qty: 30 CAP | Refills: 0 | Status: SHIPPED | OUTPATIENT
Start: 2020-12-29 | End: 2021-01-27

## 2021-01-23 DIAGNOSIS — F32.0 CURRENT MILD EPISODE OF MAJOR DEPRESSIVE DISORDER WITHOUT PRIOR EPISODE (HCC): ICD-10-CM

## 2021-01-27 RX ORDER — DULOXETIN HYDROCHLORIDE 60 MG/1
CAPSULE, DELAYED RELEASE ORAL
Qty: 90 CAP | Refills: 0 | Status: SHIPPED | OUTPATIENT
Start: 2021-01-27

## 2021-03-15 DIAGNOSIS — Z23 NEED FOR VACCINATION: ICD-10-CM

## 2023-02-01 NOTE — TELEPHONE ENCOUNTER
Health Maintenance Due   Topic Date Due   • Hepatitis B Vaccine (2 of 3 - 3-dose series) 2022   • IPV Vaccine (1 of 4 - 4-dose series) Never done   • HIB Vaccine (1 of 4 - Standard series) Never done   • Rotavirus Vaccine (1 of 3 - 3-dose series) Never done       Patient is due for topics listed above, she wishes to proceed with Immunization(s) Rotavirus, but is not proceeding with Immunization(s) Hep B, HIB and IPV at this time. The following has occurred: Will come back next week for vaccines.    Needs a virtual visit.

## 2024-08-01 ENCOUNTER — TELEPHONE (OUTPATIENT)
Dept: HEALTH INFORMATION MANAGEMENT | Facility: OTHER | Age: 64
End: 2024-08-01
Payer: MEDICARE

## 2024-08-05 ENCOUNTER — OFFICE VISIT (OUTPATIENT)
Dept: FAMILY PLANNING/WOMEN'S HEALTH CLINIC | Facility: PHYSICIAN GROUP | Age: 64
End: 2024-08-05
Payer: MEDICARE

## 2024-08-05 VITALS
HEIGHT: 63 IN | WEIGHT: 155 LBS | BODY MASS INDEX: 27.46 KG/M2 | DIASTOLIC BLOOD PRESSURE: 78 MMHG | SYSTOLIC BLOOD PRESSURE: 126 MMHG

## 2024-08-05 DIAGNOSIS — G47.09 OTHER INSOMNIA: ICD-10-CM

## 2024-08-05 DIAGNOSIS — Z91.81 RISK FOR FALLS: ICD-10-CM

## 2024-08-05 DIAGNOSIS — F33.0 MILD EPISODE OF RECURRENT MAJOR DEPRESSIVE DISORDER (HCC): ICD-10-CM

## 2024-08-05 DIAGNOSIS — Z12.31 SCREENING MAMMOGRAM, ENCOUNTER FOR: ICD-10-CM

## 2024-08-05 PROBLEM — Z02.9 ADMINISTRATIVE ENCOUNTER: Status: RESOLVED | Noted: 2018-08-02 | Resolved: 2024-08-05

## 2024-08-05 PROBLEM — Z00.00 PREVENTATIVE HEALTH CARE: Status: RESOLVED | Noted: 2018-08-02 | Resolved: 2024-08-05

## 2024-08-05 PROBLEM — Z23 NEED FOR VACCINATION: Status: RESOLVED | Noted: 2018-08-02 | Resolved: 2024-08-05

## 2024-08-05 PROBLEM — M79.645 PAIN OF FINGER OF LEFT HAND: Status: RESOLVED | Noted: 2020-09-02 | Resolved: 2024-08-05

## 2024-08-05 PROBLEM — F32.0 CURRENT MILD EPISODE OF MAJOR DEPRESSIVE DISORDER (HCC): Status: ACTIVE | Noted: 2018-08-02

## 2024-08-05 PROBLEM — H66.003 BILATERAL ACUTE SUPPURATIVE OTITIS MEDIA: Status: RESOLVED | Noted: 2018-08-31 | Resolved: 2024-08-05

## 2024-08-05 PROCEDURE — 3074F SYST BP LT 130 MM HG: CPT

## 2024-08-05 PROCEDURE — 3078F DIAST BP <80 MM HG: CPT

## 2024-08-05 PROCEDURE — G0439 PPPS, SUBSEQ VISIT: HCPCS

## 2024-08-05 RX ORDER — TRAZODONE HYDROCHLORIDE 50 MG/1
50 TABLET, FILM COATED ORAL NIGHTLY
COMMUNITY

## 2024-08-05 SDOH — ECONOMIC STABILITY: TRANSPORTATION INSECURITY
IN THE PAST 12 MONTHS, HAS LACK OF TRANSPORTATION KEPT YOU FROM MEETINGS, WORK, OR FROM GETTING THINGS NEEDED FOR DAILY LIVING?: NO

## 2024-08-05 SDOH — ECONOMIC STABILITY: INCOME INSECURITY: IN THE LAST 12 MONTHS, WAS THERE A TIME WHEN YOU WERE NOT ABLE TO PAY THE MORTGAGE OR RENT ON TIME?: NO

## 2024-08-05 SDOH — ECONOMIC STABILITY: FOOD INSECURITY: WITHIN THE PAST 12 MONTHS, THE FOOD YOU BOUGHT JUST DIDN'T LAST AND YOU DIDN'T HAVE MONEY TO GET MORE.: NEVER TRUE

## 2024-08-05 SDOH — ECONOMIC STABILITY: TRANSPORTATION INSECURITY
IN THE PAST 12 MONTHS, HAS THE LACK OF TRANSPORTATION KEPT YOU FROM MEDICAL APPOINTMENTS OR FROM GETTING MEDICATIONS?: NO

## 2024-08-05 SDOH — ECONOMIC STABILITY: INCOME INSECURITY: HOW HARD IS IT FOR YOU TO PAY FOR THE VERY BASICS LIKE FOOD, HOUSING, MEDICAL CARE, AND HEATING?: NOT HARD AT ALL

## 2024-08-05 SDOH — ECONOMIC STABILITY: HOUSING INSECURITY
IN THE LAST 12 MONTHS, WAS THERE A TIME WHEN YOU DID NOT HAVE A STEADY PLACE TO SLEEP OR SLEPT IN A SHELTER (INCLUDING NOW)?: NO

## 2024-08-05 SDOH — ECONOMIC STABILITY: FOOD INSECURITY: WITHIN THE PAST 12 MONTHS, YOU WORRIED THAT YOUR FOOD WOULD RUN OUT BEFORE YOU GOT MONEY TO BUY MORE.: NEVER TRUE

## 2024-08-05 SDOH — ECONOMIC STABILITY: HOUSING INSECURITY: IN THE LAST 12 MONTHS, HOW MANY PLACES HAVE YOU LIVED?: 1

## 2024-08-05 ASSESSMENT — PAIN SCALES - GENERAL: PAINLEVEL: 1=MINIMAL PAIN

## 2024-08-05 ASSESSMENT — ACTIVITIES OF DAILY LIVING (ADL): BATHING_REQUIRES_ASSISTANCE: 0

## 2024-08-05 ASSESSMENT — PATIENT HEALTH QUESTIONNAIRE - PHQ9
SUM OF ALL RESPONSES TO PHQ QUESTIONS 1-9: 3
CLINICAL INTERPRETATION OF PHQ2 SCORE: 3
5. POOR APPETITE OR OVEREATING: 0 - NOT AT ALL

## 2024-08-05 ASSESSMENT — FIBROSIS 4 INDEX: FIB4 SCORE: 0.54

## 2024-08-05 ASSESSMENT — ENCOUNTER SYMPTOMS: GENERAL WELL-BEING: GOOD

## 2024-08-05 NOTE — ASSESSMENT & PLAN NOTE
BMI is 27.46 kg/m2. Provided education on heart healthy diet with adequate intake of fruits, vegetables, and whole grains. Reports that she uses her home treadmill a few times a week. Encourage 30 minutes of moderate exercise 3-4 times a week.

## 2024-08-05 NOTE — ASSESSMENT & PLAN NOTE
Chronic, stable. Currently taking trazodone 50 mg nightly. Reports an average of 7 hours of sleep per night. Denies excessive sedation and amnesia.

## 2024-08-05 NOTE — ASSESSMENT & PLAN NOTE
Chronic, ongoing. Currently taking duloxetine 60 mg daily. Discussed potential for a referral to individual therapy, denied at this time. Denies SI/HI.

## 2024-08-05 NOTE — PROGRESS NOTES
Comprehensive Health Assessment Program     JIN HOGUE is a 63 y.o. here for her comprehensive health assessment.    Patient Active Problem List    Diagnosis Date Noted    BMI 27.0-27.9,adult 08/05/2024    Other insomnia 08/05/2024    Screening mammogram, encounter for 08/05/2024    Risk for falls 08/05/2024    De Quervain's tenosynovitis 09/02/2020    Chronic right shoulder pain 02/20/2020    Pure hypercholesterolemia 08/02/2018    Mild episode of recurrent major depressive disorder (HCC) 08/02/2018    Atypical chest pain 11/11/2014       Current Outpatient Medications   Medication Sig Dispense Refill    traZODone (DESYREL) 50 MG Tab Take 50 mg by mouth every evening.      DULoxetine (CYMBALTA) 60 MG Cap DR Particles delayed-release capsule TAKE 1 CAPSULE BY MOUTH EVERY DAY 90 Cap 0    Multiple Vitamin (MULTI-VITAMIN DAILY PO) Take  by mouth.       No current facility-administered medications for this visit.          Current supplements as per medication list.     Allergies:   Patient has no known allergies.  Social History     Tobacco Use    Smoking status: Never    Smokeless tobacco: Never   Vaping Use    Vaping status: Never Used   Substance Use Topics    Alcohol use: No     Comment: very rarely    Drug use: No     Family History   Problem Relation Age of Onset    Hypertension Mother     Alzheimer's Disease Mother     Alcohol/Drug Father         ETOH    Cancer Brother 70        colon      JIN  has a past medical history of Administrative encounter (08/02/2018), Bilateral acute suppurative otitis media (08/31/2018), Need for vaccination (08/02/2018), Pain of finger of left hand (09/02/2020), and Preventative health care (08/02/2018).   Past Surgical History:   Procedure Laterality Date    ABDOMINAL HYSTERECTOMY TOTAL         Screening:  In the last six months have you experienced any leakage of urine? No    Depression Screening  Little interest or pleasure in doing things?  0 - not at all  Feeling  down, depressed , or hopeless? 3 - nearly every day  Trouble falling or staying asleep, or sleeping too much?  0 - not at all  Feeling tired or having little energy?  0 - not at all  Poor appetite or overeating?  0 - not at all  Feeling bad about yourself - or that you are a failure or have let yourself or your family down? 0 - not at all  Trouble concentrating on things, such as reading the newspaper or watching television? 0 - not at all  Moving or speaking so slowly that other people could have noticed.  Or the opposite - being so fidgety or restless that you have been moving around a lot more than usual?  0 - not at all  Thoughts that you would be better off dead, or of hurting yourself?  0 - not at all  Patient Health Questionnaire Score: 3    If depressive symptoms identified deferred to follow up visit unless specifically addressed in assessment and plan.    Interpretation of PHQ-9 Total Score   Score Severity   1-4 No Depression   5-9 Mild Depression   10-14 Moderate Depression   15-19 Moderately Severe Depression   20-27 Severe Depression    Screening for Cognitive Impairment  Do you or any of your friends or family members have any concern about your memory? Yes  Three Minute Recall (Leader, Season, Table) 3/3    Andrew clock face with all 12 numbers and set the hands to show 10 minutes after 11.  Yes 5/5  Cognitive concerns identified deferred for follow up unless specifically addressed in assessment and plan.    Fall Risk Assessment  Has the patient had two or more falls in the last year or any fall with injury in the last year?  Yes    Safety Assessment  Do you always wear your seatbelt?  Yes  Any changes to home needed to function safely? No  Difficulty hearing.  Yes  Patient counseled about all safety risks that were identified.    Functional Assessment ADLs  Are there any barriers preventing you from cooking for yourself or meeting nutritional needs?  No.    Are there any barriers preventing you from  driving safely or obtaining transportation?  No.    Are there any barriers preventing you from using a telephone or calling for help?  No    Are there any barriers preventing you from shopping?  No.    Are there any barriers preventing you from taking care of your own finances?  No    Are there any barriers preventing you from managing your medications?  No    Are there any barriers preventing you from showering, bathing or dressing yourself? No    Are there any barriers preventing you from doing housework or laundry? No    Are there any barriers preventing you from using the toilet?No    Are you currently engaging in any exercise or physical activity?  Yes.      Self-Assessment of Health  What is your perception of your health? Good    Do you sleep more than six hours a night? Yes    In the past 7 days, how much did pain keep you from doing your normal work? None    Do you spend quality time with family or friends (virtually or in person)? Yes    Do you usually eat a heart healthy diet that constists of a variety of fruits, vegetables, whole grains and fiber? Yes    Do you eat foods high in fat and/or Fast Food more than three times per week? No    How concerned are you that your medical conditions are not being well managed? Not at all    Are you worried that in the next 2 months, you may not have stable housing that you own, rent, or stay in as part of a household? No        Advance Care Planning  Do you have an Advance Directive, Living Will, Durable Power of , or POLST? No   Provided patient with educational brochure regarding Advance Care Planning.                  Health Maintenance Summary            Overdue - HIV Screening (Once) Never done      No completion history exists for this topic.              Overdue - Hepatitis C Screening (Once) Never done      No completion history exists for this topic.              Ordered - Mammogram (Every 2 Years) Ordered on 8/5/2024 08/18/2018  MA-MAMMO  SCREENING BILAT W/SHELLY W/O CAD              Influenza Vaccine (1) Next due on 9/1/2024      10/09/2023  Outside Immunization: Influenza Quad Inj P    10/24/2022  Outside Immunization: Influenza Quad Inj P    10/21/2020  Outside Immunization: Influenza Quad Inj P    02/20/2020  Imm Admin: Influenza Vaccine Quad Inj (Pf)              IMM DTaP/Tdap/Td Vaccine (2 - Td or Tdap) Next due on 8/2/2028 08/02/2018  Imm Admin: Tdap Vaccine              Colorectal Cancer Screening (Colonoscopy - Every 10 Years) Tentatively due on 11/2/2028 11/02/2018  REFERRAL TO GI FOR COLONOSCOPY    07/09/2009  REFERRAL TO GI FOR COLONOSCOPY              Zoster (Shingles) Vaccines (Series Information) Completed      01/03/2023  Outside Immunization: Zoster Louie (Shingrix)    09/26/2022  Outside Immunization: Zoster Louie (Shingrix)              COVID-19 Vaccine (Series Information) Completed      10/09/2023  Imm Admin: Comirnaty (Covid-19 Vaccine, Mrna, 9819-8720 Formula)    10/24/2022  Imm Admin: PFIZER BIVALENT SARS-COV-2 VACCINE (12+)    04/27/2021  Imm Admin: PFIZER PURPLE CAP SARS-COV-2 VACCINATION (12+)    03/22/2021  Imm Admin: PFIZER PURPLE CAP SARS-COV-2 VACCINATION (12+)              Hepatitis A Vaccine (Hep A) (Series Information) Aged Out      No completion history exists for this topic.              Hepatitis B Vaccine (Hep B) (Series Information) Aged Out      No completion history exists for this topic.              HPV Vaccines (Series Information) Aged Out      No completion history exists for this topic.              Polio Vaccine (Inactivated Polio) (Series Information) Aged Out      No completion history exists for this topic.              Meningococcal Immunization (Series Information) Aged Out      No completion history exists for this topic.              Pneumococcal Vaccine: 0-64 Years (Series Information) Aged Out      No completion history exists for this topic.              Discontinued - Cervical Cancer  "Screening  Discontinued        Frequency changed to Never automatically (Topic No Longer Applies)    08/20/2018  THINPREP PAP WITH HPV    08/20/2018  PATHOLOGY GYN SPECIMEN                    Patient Care Team:  Malgorzata Hanley M.D. as PCP - General (Family Medicine)    Financial Resource Strain: Low Risk  (8/5/2024)    Overall Financial Resource Strain (CARDIA)     Difficulty of Paying Living Expenses: Not hard at all      Transportation Needs: No Transportation Needs (8/5/2024)    PRAPARE - Transportation     Lack of Transportation (Medical): No     Lack of Transportation (Non-Medical): No      Food Insecurity: No Food Insecurity (8/5/2024)    Hunger Vital Sign     Worried About Running Out of Food in the Last Year: Never true     Ran Out of Food in the Last Year: Never true        Encounter Vitals  Blood Pressure : 126/78  O2 Delivery Device: None - Room Air  Weight: 70.3 kg (155 lb)  Height: 160 cm (5' 3\")  BMI (Calculated): 27.46  Pain Score: 1=Minimal Pain  DME  O2 Delivery Device: None - Room Air     Alert, oriented in no acute distress.  Eye contact is good, speech goal directed, affect calm.    Assessment and Plan. The following treatment and monitoring plan is recommended:    BMI 27.0-27.9,adult  BMI is 27.46 kg/m2. Provided education on heart healthy diet with adequate intake of fruits, vegetables, and whole grains. Reports that she uses her home treadmill a few times a week. Encourage 30 minutes of moderate exercise 3-4 times a week.    Mild episode of recurrent major depressive disorder (HCC)  Chronic, ongoing. Currently taking duloxetine 60 mg daily. Discussed potential for a referral to individual therapy, declined at this time. Denies SI/HI.    Other insomnia  Chronic, stable. Currently taking trazodone 50 mg nightly. Reports an average of 7 hours of sleep per night. Denies excessive sedation and amnesia.    Risk for falls  Reports two falls within the last year. Provided education on removing " hazards from home including rugs and carpets, adequate lighting, and use of treaded slippers and socks.     Screening mammogram, encounter for  Mammogram ordered.      Services suggested: No services needed at this time  Health Care Screening: Age-appropriate preventive services recommended by USPTF and ACIP covered by Medicare were discussed today. Services ordered if indicated and agreed upon by the patient.  Referrals offered: Community-based lifestyle interventions to reduce health risks and promote self-management and wellness, fall prevention, nutrition, physical activity, tobacco-use cessation, weight loss, and mental health services as per orders if indicated.    Discussion today about general wellness and lifestyle habits:    Prevent falls and reduce trip hazards; Cautioned about securing or removing rugs.  Have a working fire alarm and carbon monoxide detector.  Engage in regular physical activity and social activities.    Follow-up: Return for appointment with Primary Care Provider as needed.

## 2024-08-05 NOTE — ASSESSMENT & PLAN NOTE
Reports two falls within the last year. Provided education on removing hazards from home including rugs and carpets, adequate lighting, and use of treaded slippers and socks.

## 2024-08-06 PROBLEM — F33.0 MILD EPISODE OF RECURRENT MAJOR DEPRESSIVE DISORDER (HCC): Status: ACTIVE | Noted: 2018-08-02

## 2024-10-03 ENCOUNTER — PATIENT MESSAGE (OUTPATIENT)
Dept: HEALTH INFORMATION MANAGEMENT | Facility: OTHER | Age: 64
End: 2024-10-03

## 2024-10-10 ENCOUNTER — PATIENT MESSAGE (OUTPATIENT)
Dept: HEALTH INFORMATION MANAGEMENT | Facility: OTHER | Age: 64
End: 2024-10-10

## 2025-02-17 ENCOUNTER — PATIENT MESSAGE (OUTPATIENT)
Dept: HEALTH INFORMATION MANAGEMENT | Facility: OTHER | Age: 65
End: 2025-02-17

## 2025-02-28 ENCOUNTER — OFFICE VISIT (OUTPATIENT)
Dept: MEDICAL GROUP | Facility: PHYSICIAN GROUP | Age: 65
End: 2025-02-28
Payer: MEDICARE

## 2025-02-28 ENCOUNTER — HOSPITAL ENCOUNTER (OUTPATIENT)
Dept: LAB | Facility: MEDICAL CENTER | Age: 65
End: 2025-02-28
Attending: FAMILY MEDICINE
Payer: MEDICARE

## 2025-02-28 VITALS
HEART RATE: 71 BPM | HEIGHT: 63 IN | WEIGHT: 160.2 LBS | TEMPERATURE: 96.9 F | BODY MASS INDEX: 28.39 KG/M2 | RESPIRATION RATE: 16 BRPM | OXYGEN SATURATION: 98 % | SYSTOLIC BLOOD PRESSURE: 118 MMHG | DIASTOLIC BLOOD PRESSURE: 70 MMHG

## 2025-02-28 DIAGNOSIS — E78.00 PURE HYPERCHOLESTEROLEMIA: ICD-10-CM

## 2025-02-28 DIAGNOSIS — R73.9 HYPERGLYCEMIA: ICD-10-CM

## 2025-02-28 DIAGNOSIS — E55.9 VITAMIN D DEFICIENCY: ICD-10-CM

## 2025-02-28 DIAGNOSIS — G47.09 OTHER INSOMNIA: ICD-10-CM

## 2025-02-28 DIAGNOSIS — F33.0 MILD EPISODE OF RECURRENT MAJOR DEPRESSIVE DISORDER (HCC): ICD-10-CM

## 2025-02-28 DIAGNOSIS — Z78.0 ASYMPTOMATIC POSTMENOPAUSAL ESTROGEN DEFICIENCY: ICD-10-CM

## 2025-02-28 DIAGNOSIS — Z23 NEED FOR VACCINATION: ICD-10-CM

## 2025-02-28 LAB
BASOPHILS # BLD AUTO: 1.1 % (ref 0–1.8)
BASOPHILS # BLD: 0.07 K/UL (ref 0–0.12)
EOSINOPHIL # BLD AUTO: 0.17 K/UL (ref 0–0.51)
EOSINOPHIL NFR BLD: 2.6 % (ref 0–6.9)
ERYTHROCYTE [DISTWIDTH] IN BLOOD BY AUTOMATED COUNT: 41.4 FL (ref 35.9–50)
EST. AVERAGE GLUCOSE BLD GHB EST-MCNC: 126 MG/DL
HBA1C MFR BLD: 6 % (ref 4–5.6)
HCT VFR BLD AUTO: 43.9 % (ref 37–47)
HGB BLD-MCNC: 13.9 G/DL (ref 12–16)
IMM GRANULOCYTES # BLD AUTO: 0.02 K/UL (ref 0–0.11)
IMM GRANULOCYTES NFR BLD AUTO: 0.3 % (ref 0–0.9)
LYMPHOCYTES # BLD AUTO: 2.08 K/UL (ref 1–4.8)
LYMPHOCYTES NFR BLD: 32.4 % (ref 22–41)
MCH RBC QN AUTO: 28.8 PG (ref 27–33)
MCHC RBC AUTO-ENTMCNC: 31.7 G/DL (ref 32.2–35.5)
MCV RBC AUTO: 91.1 FL (ref 81.4–97.8)
MONOCYTES # BLD AUTO: 0.4 K/UL (ref 0–0.85)
MONOCYTES NFR BLD AUTO: 6.2 % (ref 0–13.4)
NEUTROPHILS # BLD AUTO: 3.68 K/UL (ref 1.82–7.42)
NEUTROPHILS NFR BLD: 57.4 % (ref 44–72)
NRBC # BLD AUTO: 0 K/UL
NRBC BLD-RTO: 0 /100 WBC (ref 0–0.2)
PLATELET # BLD AUTO: 299 K/UL (ref 164–446)
PMV BLD AUTO: 10.5 FL (ref 9–12.9)
RBC # BLD AUTO: 4.82 M/UL (ref 4.2–5.4)
WBC # BLD AUTO: 6.4 K/UL (ref 4.8–10.8)

## 2025-02-28 PROCEDURE — 84443 ASSAY THYROID STIM HORMONE: CPT

## 2025-02-28 PROCEDURE — 83036 HEMOGLOBIN GLYCOSYLATED A1C: CPT

## 2025-02-28 PROCEDURE — 80053 COMPREHEN METABOLIC PANEL: CPT

## 2025-02-28 PROCEDURE — 36415 COLL VENOUS BLD VENIPUNCTURE: CPT

## 2025-02-28 PROCEDURE — 80061 LIPID PANEL: CPT

## 2025-02-28 PROCEDURE — 82306 VITAMIN D 25 HYDROXY: CPT

## 2025-02-28 PROCEDURE — 85025 COMPLETE CBC W/AUTO DIFF WBC: CPT

## 2025-02-28 RX ORDER — GLUCOSAMINE SULFATE 500 MG
CAPSULE ORAL DAILY
COMMUNITY
End: 2025-02-28

## 2025-02-28 ASSESSMENT — PATIENT HEALTH QUESTIONNAIRE - PHQ9
5. POOR APPETITE OR OVEREATING: 1 - SEVERAL DAYS
CLINICAL INTERPRETATION OF PHQ2 SCORE: 2
SUM OF ALL RESPONSES TO PHQ QUESTIONS 1-9: 7

## 2025-02-28 NOTE — PROGRESS NOTES
Subjective:     CC:    Chief Complaint   Patient presents with    Establish Care       HISTORY OF THE PRESENT ILLNESS: Patient is a 64 y.o. female. This pleasant patient is here today to establish care. Her prior PCP was Dr. Hanley.  Patient feels she is overdue for labs.  Patient was told that her blood sugar was slightly elevated last time.  Patient states she did have a mammogram done at [facility] so we will need to request that.  On looking at her chart it looks like the last time she was seen by gastroenterology consultants for colonoscopy was 2018.  Patient does have a brother after 2018 that  from colon cancer.  Patient is agreeable for me to rerefer her for a colonoscopy.  Patient denies any issues with her bowel habits.    No problem-specific Assessment & Plan notes found for this encounter.      Allergies: Cephalexin    Current Outpatient Medications Ordered in Epic   Medication Sig Dispense Refill    traZODone (DESYREL) 50 MG Tab Take 50 mg by mouth every evening.      DULoxetine (CYMBALTA) 60 MG Cap DR Particles delayed-release capsule TAKE 1 CAPSULE BY MOUTH EVERY DAY 90 Cap 0    Multiple Vitamin (MULTI-VITAMIN DAILY PO) Take  by mouth.      meloxicam (MOBIC) 15 MG tablet Take 1 Tablet by mouth every day. (Patient not taking: Reported on 2025) 30 Tablet 0     No current Epic-ordered facility-administered medications on file.     Family History   Problem Relation Age of Onset    Hypertension Mother     Alzheimer's Disease Mother     Alcohol/Drug Father         ETOH    Cancer Brother 70        colon     Alzheimer's Disease Brother     Heart Disease Brother          Past Medical History:   Diagnosis Date    Administrative encounter 2018    Bilateral acute suppurative otitis media 2018    Need for vaccination 2018    Pain of finger of left hand 2020    Preventative health care 2018       Past Surgical History:   Procedure Laterality Date    ABDOMINAL  "HYSTERECTOMY TOTAL      BLADDER SUSPENSION      FOOT SURGERY Right     TUBAL LIGATION         Social History     Tobacco Use    Smoking status: Never    Smokeless tobacco: Never   Vaping Use    Vaping status: Never Used   Substance Use Topics    Alcohol use: No     Comment: very rarely    Drug use: No       Social History     Social History Narrative    Not on file       Family History   Problem Relation Age of Onset    Hypertension Mother     Alzheimer's Disease Mother     Alcohol/Drug Father         ETOH    Cancer Brother 70        colon     Alzheimer's Disease Brother     Heart Disease Brother        Health Maintenance: Completed    ROS:   Gen: no fevers/chills, no changes in weight  Eyes: no changes in vision  ENT: no sore throat, no hearing loss, no bloody nose  Pulm: no sob, no cough  CV: no chest pain, no palpitations  GI: no nausea/vomiting, no diarrhea  : no dysuria  Neuro: no headaches, no numbness/tingling  Heme/Lymph: no easy bruising      Objective:     Exam: /70 (BP Location: Right arm, Patient Position: Sitting, BP Cuff Size: Adult)   Pulse 71   Temp 36.1 °C (96.9 °F)   Resp 16   Ht 1.6 m (5' 3\")   Wt 72.7 kg (160 lb 3.2 oz)   SpO2 98%  Body mass index is 28.38 kg/m².    Gen: Alert and oriented, No apparent distress.  Skin: Warm and dry.  No obvious lesions.  Eyes: Sclera wnl Pupils normal in size  ENT: Canals wnl and TM are not red, mouth negative redness and exudates  Lungs: Normal effort, CTA bilaterally, no wheezes, rhonchi, or rales  CV: Regular rate and rhythm. No murmurs, rubs, or gallops.  ABD: Soft non-tender no organomegaly  Musculoskeletal: Normal gait. No extremity cyanosis, clubbing, or edema.  Neuro: Oriented to person, place and time  Psych: Mood is wnl     Labs: Fasting labs were ordered    Assessment & Plan:   64 y.o. female with the following -    1. Pure hypercholesterolemia  Recommend checking the cholesterol  - Comp Metabolic Panel; Future  - Lipid Profile; " Future    2. Other insomnia  Patient is on trazodone for this    3. Mild episode of recurrent major depressive disorder (HCC)  Patient is on Cymbalta for this  - CBC WITH DIFFERENTIAL; Future  - TSH; Future    4. Need for vaccination  - Influenza Vaccine, High Dose (65+ Only)    5. Asymptomatic postmenopausal estrogen deficiency  Recommend scheduling patient for a bone density patient agreeable with the plan  - DS-BONE DENSITY STUDY (DEXA); Future    6. Hyperglycemia  Recommend getting lab work done  - HEMOGLOBIN A1C; Future    7. Vitamin D deficiency  - VITAMIN D,25 HYDROXY (DEFICIENCY); Future       Return in about 4 weeks (around 3/28/2025), or if symptoms worsen or fail to improve.    Please note that this dictation was created using voice recognition software. I have made every reasonable attempt to correct obvious errors, but I expect that there are errors of grammar and possibly content that I did not discover before finalizing the note.

## 2025-02-28 NOTE — LETTER
[facility]  Sirena Aguilar M.D.  [address]  [place] [address]  Fax: [phone]   Authorization for Release/Disclosure of   Protected Health Information   Name: JIN HOGUE : 1960 SSN: [id number]   Address: [address] [place] [address] [address] [place] [address] Phone:    [phone] (home)    I authorize the entity listed below to release/disclose the PHI below to:   [facility]/Sirena Aguilar M.D. and Sirena Aguilar M.D.   Provider or Entity Name:  [facility]   Address   [facility], [facility]  [address] [place] [address] Phone: [phone]       Fax: [phone]     Reason for request: continuity of care   Information to be released:    [  ] LAST COLONOSCOPY,  including any PATH REPORT and follow-up  [  ] LAST FIT/COLOGUARD RESULT [  ] LAST DEXA  [ XXX ] LAST MAMMOGRAM  [  ] LAST PAP  [  ] LAST LABS [  ] RETINA EXAM REPORT  [  ] IMMUNIZATION RECORDS  [  ] Release all info      [  ] Check here and initial the line next to each item to release ALL health information INCLUDING  _____ Care and treatment for drug and / or alcohol abuse  _____ HIV testing, infection status, or AIDS  _____ Genetic Testing    DATES OF SERVICE OR TIME PERIOD TO BE DISCLOSED: _____________  I understand and acknowledge that:  * This Authorization may be revoked at any time by you in writing, except if your health information has already been used or disclosed.  * Your health information that will be used or disclosed as a result of you signing this authorization could be re-disclosed by the recipient. If this occurs, your re-disclosed health information may no longer be protected by State or Federal laws.  * You may refuse to sign this Authorization. Your refusal will not affect your ability to obtain treatment.  * This Authorization becomes effective upon signing and will  on (date) __________.      If no date is indicated, this Authorization will  one (1) year from the  signature date.    Name: JIN S HOGUE  Signature: Date:   2/28/2025     PLEASE FAX REQUESTED RECORDS BACK TO: [phone]

## 2025-03-01 LAB
25(OH)D3 SERPL-MCNC: 20 NG/ML (ref 30–100)
ALBUMIN SERPL BCP-MCNC: 4.1 G/DL (ref 3.2–4.9)
ALBUMIN/GLOB SERPL: 1.5 G/DL
ALP SERPL-CCNC: 86 U/L (ref 30–99)
ALT SERPL-CCNC: 16 U/L (ref 2–50)
ANION GAP SERPL CALC-SCNC: 9 MMOL/L (ref 7–16)
AST SERPL-CCNC: 22 U/L (ref 12–45)
BILIRUB SERPL-MCNC: 0.4 MG/DL (ref 0.1–1.5)
BUN SERPL-MCNC: 11 MG/DL (ref 8–22)
CALCIUM ALBUM COR SERPL-MCNC: 9.2 MG/DL (ref 8.5–10.5)
CALCIUM SERPL-MCNC: 9.3 MG/DL (ref 8.5–10.5)
CHLORIDE SERPL-SCNC: 106 MMOL/L (ref 96–112)
CHOLEST SERPL-MCNC: 229 MG/DL (ref 100–199)
CO2 SERPL-SCNC: 24 MMOL/L (ref 20–33)
CREAT SERPL-MCNC: 0.74 MG/DL (ref 0.5–1.4)
GFR SERPLBLD CREATININE-BSD FMLA CKD-EPI: 89 ML/MIN/1.73 M 2
GLOBULIN SER CALC-MCNC: 2.7 G/DL (ref 1.9–3.5)
GLUCOSE SERPL-MCNC: 90 MG/DL (ref 65–99)
HDLC SERPL-MCNC: 44 MG/DL
LDLC SERPL CALC-MCNC: 150 MG/DL
POTASSIUM SERPL-SCNC: 4.3 MMOL/L (ref 3.6–5.5)
PROT SERPL-MCNC: 6.8 G/DL (ref 6–8.2)
SODIUM SERPL-SCNC: 139 MMOL/L (ref 135–145)
TRIGL SERPL-MCNC: 173 MG/DL (ref 0–149)
TSH SERPL-ACNC: 1.26 UIU/ML (ref 0.35–5.5)

## 2025-03-14 ENCOUNTER — HOSPITAL ENCOUNTER (OUTPATIENT)
Dept: RADIOLOGY | Facility: MEDICAL CENTER | Age: 65
End: 2025-03-14

## 2025-03-14 ENCOUNTER — HOSPITAL ENCOUNTER (OUTPATIENT)
Dept: RADIOLOGY | Facility: MEDICAL CENTER | Age: 65
End: 2025-03-14
Attending: FAMILY MEDICINE
Payer: MEDICARE

## 2025-03-14 DIAGNOSIS — Z78.0 ASYMPTOMATIC POSTMENOPAUSAL ESTROGEN DEFICIENCY: ICD-10-CM

## 2025-03-14 PROCEDURE — 77080 DXA BONE DENSITY AXIAL: CPT

## 2025-03-17 ENCOUNTER — HOSPITAL ENCOUNTER (OUTPATIENT)
Dept: RADIOLOGY | Facility: MEDICAL CENTER | Age: 65
End: 2025-03-17
Payer: MEDICARE

## 2025-03-18 ENCOUNTER — RESULTS FOLLOW-UP (OUTPATIENT)
Dept: MEDICAL GROUP | Facility: PHYSICIAN GROUP | Age: 65
End: 2025-03-18
Payer: MEDICARE

## 2025-03-19 ENCOUNTER — NON-PROVIDER VISIT (OUTPATIENT)
Dept: OCCUPATIONAL MEDICINE | Facility: CLINIC | Age: 65
End: 2025-03-19
Payer: COMMERCIAL

## 2025-03-19 ENCOUNTER — OFFICE VISIT (OUTPATIENT)
Dept: URGENT CARE | Facility: PHYSICIAN GROUP | Age: 65
End: 2025-03-19
Payer: MEDICARE

## 2025-03-19 ENCOUNTER — NON-PROVIDER VISIT (OUTPATIENT)
Dept: URGENT CARE | Facility: PHYSICIAN GROUP | Age: 65
End: 2025-03-19
Payer: COMMERCIAL

## 2025-03-19 ENCOUNTER — OCCUPATIONAL MEDICINE (OUTPATIENT)
Dept: URGENT CARE | Facility: PHYSICIAN GROUP | Age: 65
End: 2025-03-19
Payer: COMMERCIAL

## 2025-03-19 ENCOUNTER — HOSPITAL ENCOUNTER (OUTPATIENT)
Dept: RADIOLOGY | Facility: MEDICAL CENTER | Age: 65
End: 2025-03-19
Attending: STUDENT IN AN ORGANIZED HEALTH CARE EDUCATION/TRAINING PROGRAM
Payer: MEDICARE

## 2025-03-19 VITALS
DIASTOLIC BLOOD PRESSURE: 64 MMHG | OXYGEN SATURATION: 97 % | RESPIRATION RATE: 16 BRPM | BODY MASS INDEX: 28.53 KG/M2 | TEMPERATURE: 97.2 F | HEART RATE: 79 BPM | HEIGHT: 63 IN | WEIGHT: 161 LBS | SYSTOLIC BLOOD PRESSURE: 140 MMHG

## 2025-03-19 VITALS
DIASTOLIC BLOOD PRESSURE: 70 MMHG | WEIGHT: 160 LBS | HEART RATE: 66 BPM | OXYGEN SATURATION: 99 % | BODY MASS INDEX: 28.35 KG/M2 | SYSTOLIC BLOOD PRESSURE: 126 MMHG | TEMPERATURE: 96.6 F | HEIGHT: 63 IN | RESPIRATION RATE: 14 BRPM
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
## 2025-03-19 DIAGNOSIS — Z02.83 ENCOUNTER FOR DRUG SCREENING: ICD-10-CM

## 2025-03-19 DIAGNOSIS — S80.02XA CONTUSION OF LEFT KNEE, INITIAL ENCOUNTER: ICD-10-CM

## 2025-03-19 DIAGNOSIS — Z02.89 ENCOUNTER FOR OCCUPATIONAL HEALTH ASSESSMENT: Primary | ICD-10-CM

## 2025-03-19 DIAGNOSIS — S89.92XA INJURY OF LEFT KNEE, INITIAL ENCOUNTER: ICD-10-CM

## 2025-03-19 LAB
AMP AMPHETAMINE: NORMAL
BAR BARBITURATES: NORMAL
BREATH ALCOHOL COMMENT: NEGATIVE
BZO BENZODIAZEPINES: NORMAL
COC COCAINE: NORMAL
INT CON NEG: NORMAL
INT CON POS: NORMAL
MDMA ECSTASY: NORMAL
MET METHAMPHETAMINES: NORMAL
MTD METHADONE: NORMAL
OPI OPIATES: POSITIVE
OXY OXYCODONE: NORMAL
PCP PHENCYCLIDINE: NORMAL
POC BREATHALIZER: 0 PERCENT (ref 0–0.01)
POC URINE DRUG SCREEN OCDRS: POSITIVE
THC: NORMAL

## 2025-03-19 PROCEDURE — 3078F DIAST BP <80 MM HG: CPT | Performed by: STUDENT IN AN ORGANIZED HEALTH CARE EDUCATION/TRAINING PROGRAM

## 2025-03-19 PROCEDURE — 82075 ASSAY OF BREATH ETHANOL: CPT | Performed by: PHYSICIAN ASSISTANT

## 2025-03-19 PROCEDURE — 8911 PR MRO FEE: Performed by: NURSE PRACTITIONER

## 2025-03-19 PROCEDURE — 80305 DRUG TEST PRSMV DIR OPT OBS: CPT | Performed by: PHYSICIAN ASSISTANT

## 2025-03-19 PROCEDURE — 99214 OFFICE O/P EST MOD 30 MIN: CPT | Performed by: STUDENT IN AN ORGANIZED HEALTH CARE EDUCATION/TRAINING PROGRAM

## 2025-03-19 PROCEDURE — 73564 X-RAY EXAM KNEE 4 OR MORE: CPT | Mod: LT

## 2025-03-19 PROCEDURE — 3074F SYST BP LT 130 MM HG: CPT | Performed by: STUDENT IN AN ORGANIZED HEALTH CARE EDUCATION/TRAINING PROGRAM

## 2025-03-19 ASSESSMENT — FIBROSIS 4 INDEX
FIB4 SCORE: 1.195652173913043478
FIB4 SCORE: 1.195652173913043478

## 2025-03-19 NOTE — LETTER
"  EMPLOYEE’S CLAIM FOR COMPENSATION/ REPORT OF INITIAL TREATMENT  FORM C-4  PLEASE TYPE OR PRINT    EMPLOYEE’S CLAIM - PROVIDE ALL INFORMATION REQUESTED   First Name                    YENNI Wylie                  Last Name  Francisco Javier Birthdate                    1960                Sex  [x]Female Claim Number (Insurer’s Use Only)     Mailing Address  [address] Age  64 y.o. Height  1.6 m (5' 3\") Weight  72.6 kg (160 lb) Social Security Number     [facility]  [id number] Telephone  There are no phone numbers on file.   Email  [email or web address]    Primary Language Spoken  English    INSURER   THIRD-PARTY   [employer]   Employee's Occupation (Job Title) When Injury or Occupational Disease Occurred      Employer's Name/Company Name  [employer]  Telephone  [phone]    Office Mail Address (Number and Street)  [address]     Date of Injury (if applicable) 3/19/2025               Hours Injury (if applicable)  2:43 PM am    pm Date Employer Notified  3/19/2025 Last Day of Work after Injury or Occupational Disease  3/19/2025 Supervisor to Whom Injury Reported  Arcadio Carolinaall   Address or Location of Accident (if applicable)  Work [1]   What were you doing at the time of accident? (if applicable)  Walking    How did this injury or occupational disease occur? (Be specific and answer in detail. Use additional sheet if necessary)  Fell on knees   If you believe that you have an occupational disease, when did you first have knowledge of the disability and its relationship to your employment?  n/a Witnesses to the Accident (if applicable)  n/a      Nature of Injury or Occupational Disease  Workers' Compensation  Part(s) of Body Injured or Affected  Knee (L) Knee (R)     I CERTIFY THAT THE ABOVE IS TRUE AND CORRECT TO T HE BEST OF MY KNOWLEDGE AND THAT I HAVE " PROVIDED THIS INFORMATION IN ORDER TO OBTAIN THE BENEFITS OF [place] OR [place]  I HEREBY AUTHORIZE ANY PHYSICIAN, CHIROPRACTOR, SURGEON, PRACTITIONER OR ANY OTHER PERSON, ANY HOSPITAL, INCLUDING [facility] OR [facility], ANY  MEDICAL SERVICE ORGANIZATION, ANY INSURANCE COMPANY, OR OTHER INSTITUTION OR ORGANIZATION TO RELEASE TO EACH OTHER, ANY MEDICAL OR OTHER INFORMATION, INCLUDING BENEFITS PAID OR PAYABLE, PERTINENT TO THIS INJURY OR DISEASE, EXCEPT INFORMATION RELATIVE TO DIAGNOSIS, TREATMENT AND/OR COUNSELING FOR AIDS, PSYCHOLOGICAL CONDITIONS, ALCOHOL OR CONTROLLED SUBSTANCES, FOR WHICH I MUST GIVE SPECIFIC AUTHORIZATION.  A PHOTOSTAT OF THIS AUTHORIZATION SHALL BE VALID AS THE ORIGINAL.     Date 3/19/2025   Place [facility] Employee’s Original or  *Electronic Signature   THIS REPORT MUST BE COMPLETED AND MAILED WITHIN 3 WORKING DAYS OF TREATMENT   Place  [facility]    Name of Facility  [facility]   Date 3/19/2025 Diagnosis and Description of Injury or Occupational Disease  (W18.30XA) Ground-level fall  (S80.02XA) Contusion of left knee, initial encounter  Diagnoses of Ground-level fall and Contusion of left knee, initial encounter were pertinent to this visit. Is there evidence that the injured employee was under the influence of alcohol and/or another controlled substance at the time of accident?  []No  [] Yes (if yes, please explain)   Hour 5:21 PM  No   Treatment: Patient's presentation physical exam findings are consistent with a contusion to the left knee secondary to a ground-level fall earlier today.  Having pain over the patellar tendon and tibial tuberosity.  She does have some mild ecchymosis and swelling to the area.  Pain with activity and with standing.  Knee brace provided in clinic.  Patient does not want crutches at this time.  She did report that  this is her last day at that employer.  No restrictions put in place at this time as she will not be returning to work.  Follow-up in urgent care in 7 days for reevaluation.  Discussed elevation, ice, and ibuprofen at home.    Have you advised the patient to remain off work five days or more?   No  [] Yes  If yes, indicate dates: From_ _                                                      To __ _  [] No   If no, is the injured employee capable of: [] full duty No   [] modified duty Yes    If modified duty, specify any limitations / restrictions:__________________  ___ ___________________________     X-Ray Findings: Negative    From information given by the employee, together with medical evidence, can you directly connect this injury or occupational disease as job incurred?  []Yes   [] No Yes    Is additional medical care by a physician indicated? []Yes [] No  Yes    Do you know of any previous injury or disease contributing to this condition or occupational disease? []Yes [] No (Explain if yes)                          No   Date  3/19/2025 Print Health Care Provider’s Name  Augustine Rosado P.A.-C. I certify that the employer’s copy of  this form was delivered to the employer on:   Address  [address]  [facility] [address] INSURER'S USE ONLY                       [facility]  [address] Provider’s Tax ID Number  [id number]   Telephone  Dept: [phone]    Health Care Provider’s Original or Electronic Signature      e-AUGUSTINE Flores P.A.-C.    Degree (MD,DO, DC,PA-C,APRN)          ORIGINAL - TREATING HEALTHCARE PROVIDER PAGE 2 - INSURER/TPA PAGE 3 - EMPLOYER PAGE 4 - EMPLOYEE             Form C-4 (rev.02/25)

## 2025-03-19 NOTE — LETTER
PHYSICIAN’S AND CHIROPRACTIC PHYSICIAN'S   PROGRESS REPORT   CERTIFICATION OF DISABILITY Claim Number:     Social Security Number:    Patient’s Name: Inessa Mcintyre Date of Injury: 3/19/2025   Employer: [employer] Name of MCO (if applicable):      Patient’s Job Description/Occupation:        Previous Injuries/Diseases/Surgeries Contributing to the Condition:         Diagnosis: (W18.30XA) Ground-level fall  (S80.02XA) Contusion of left knee, initial encounter      Related to the Industrial Injury? Yes     Explain: Ground-level fall earlier today while at work.      Objective Medical Findings: Left knee: Ecchymosis and swelling over the tibial tuberosity.  Pain to the patellar tendon with extension and flexion.  Pain with standing.  No malalignment.  Small abrasion to the left kneecap.    Right knee: Discomfort with ambulation walking but no bony tenderness.  Lying ecchymosis.         None - Discharged                         Stable  No                 Ratable  No        Generally Improved                         Condition Worsened                  Condition Same  May Have Suffered a Permanent Disability No     Treatment Plan:    Patient's presentation physical exam findings are consistent with a contusion to the left knee secondary to a ground-level fall earlier today.  Having pain over the patellar tendon and tibial tuberosity.  She does have some mild ecchymosis and swelling to the area.  Pain with activity and with standing.  Knee brace provided in clinic.  Patient does not want crutches at this time.  She did report that this is her last day at that employer.  No restrictions put in place at this time as she will not be returning to work.  Follow-up in urgent care in 7 days for reevaluation.  Discussed elevation, ice, and ibuprofen at home.         No Change in Therapy                  PT/OT Prescribed                      Medication May be Used While Working        Case Management                           PT/OT Discontinued    Consultation    Further Diagnostic Studies:    Prescription(s)                 Released to FULL DUTY /No Restrictions on (Date):       Certified TOTALLY TEMPORARILY DISABLED (Indicate Dates) From:   To:    X  Released to RESTRICTED/Modified Duty on (Date): From: 3/19/2025 To: 3/26/2025  Restrictions Are:         No Sitting    No Standing    No Pulling Other: No standing or walking.         No Bending at Waist     No Stooping X    No Lifting        No Carrying     No Walking Lifting Restricted to (lbs.):          No Pushing        No Climbing     No Reaching Above Shoulders       Date of Next Visit:  3/26/2025 Date of this Exam: 3/19/2025 Physician/Chiropractic Physician Name: Augustine Rosado P.A.-C. Physician/Chiropractic Physician Signature:  Joey Bland DO MPH     [place]:  [address] [place] [address], [address] [place], [place] [address] - Telephone [phone] [place]:  [address] [place] [address], [address] [place], [place] [address] - Telephone [phone]    [email or web address]  D-39 (Rev. 10/24)

## 2025-03-20 NOTE — PROGRESS NOTES
"Subjective:     Inessa Mcintyre is a 64 y.o. female who presents for Knee Injury (New  doi: 03/19/2025 left knee in jury, she fell and landed on both knees but says her left one is hurting a lot more )    64-year-old female presents to urgent care after sustaining a ground-level fall earlier today while at work.  She is having pain to both knees but significantly more on the left side than the right.  The right does have some discomfort with standing and walking but not nearly as bad as the left side.  Pain just under the left kneecap and over the kneecap itself.  No numbness, tingling, burning.    PMH:   No pertinent past medical history to this problem  MEDS:  Medications were reviewed in EMR  ALLERGIES:  Allergies were reviewed in EMR  FH:   No pertinent family history to this problem       Objective:     /70   Pulse 66   Temp 35.9 °C (96.6 °F) (Temporal)   Resp 14   Ht 1.6 m (5' 3\")   Wt 72.6 kg (160 lb)   LMP  (LMP Unknown)   SpO2 99%   BMI 28.34 kg/m²     Left knee: Ecchymosis and swelling over the tibial tuberosity.  Pain to the patellar tendon with extension and flexion.  Pain with standing.  No malalignment.  Small abrasion to the left kneecap.    Right knee: Discomfort with ambulation walking but no bony tenderness.  Lying ecchymosis.  .asx  Assessment/Plan:       1. Ground-level fall  - DX-KNEE COMPLETE 4+ LEFT; Future    2. Contusion of left knee, initial encounter  - DX-KNEE COMPLETE 4+ LEFT; Future      FROM   TO    No standing or walking.     Patient's presentation physical exam findings are consistent with a contusion to the left knee secondary to a ground-level fall earlier today.  Having pain over the patellar tendon and tibial tuberosity.  She does have some mild ecchymosis and swelling to the area.  Pain with activity and with standing.  Knee brace provided in clinic.  Patient does not want crutches at this time.  She did report that this is her last day at that employer.  No " restrictions put in place at this time as she will not be returning to work.  Follow-up in urgent care in 7 days for reevaluation.  Discussed elevation, ice, and ibuprofen at home.    Differential diagnosis, natural history, supportive care, and indications for immediate follow-up discussed.

## 2025-03-20 NOTE — PROGRESS NOTES
Inessa Mcintyre is a 64 y.o. female here for a non-provider visit for post accident Bat and drug screen. All results sent to [facility]     If abnormal was an in office provider notified today (if so, indicate provider)? No    Routed to PCP? No

## 2025-03-26 ENCOUNTER — OCCUPATIONAL MEDICINE (OUTPATIENT)
Dept: OCCUPATIONAL MEDICINE | Facility: CLINIC | Age: 65
End: 2025-03-26
Payer: COMMERCIAL

## 2025-03-26 VITALS
TEMPERATURE: 97.3 F | WEIGHT: 159 LBS | HEART RATE: 67 BPM | RESPIRATION RATE: 16 BRPM | OXYGEN SATURATION: 96 % | DIASTOLIC BLOOD PRESSURE: 72 MMHG | BODY MASS INDEX: 28.17 KG/M2 | HEIGHT: 63 IN | SYSTOLIC BLOOD PRESSURE: 118 MMHG

## 2025-03-26 DIAGNOSIS — S80.00XD CONTUSION OF KNEE, UNSPECIFIED LATERALITY, SUBSEQUENT ENCOUNTER: ICD-10-CM
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
## 2025-03-26 DIAGNOSIS — S80.02XD CONTUSION OF LEFT KNEE, SUBSEQUENT ENCOUNTER: ICD-10-CM

## 2025-03-26 PROCEDURE — 99213 OFFICE O/P EST LOW 20 MIN: CPT | Performed by: NURSE PRACTITIONER

## 2025-03-26 ASSESSMENT — PAIN SCALES - GENERAL: PAINLEVEL_OUTOF10: NO PAIN

## 2025-03-26 ASSESSMENT — FIBROSIS 4 INDEX: FIB4 SCORE: 1.195652173913043478

## 2025-03-26 NOTE — PROGRESS NOTES
"Subjective:     Inessa Mcintyre is a 64 y.o. female who presents for Other ([facility] DOI 3/19/25 BOTH KNEES- PAIN 0- EX 16)    DOI: 03/19/2025 . RADHA: She fell and landed on both knees but says her left one is hurting a lot more        The patient states that the right knee feels improved there is some soreness but the left knee is severely painful.  Patient reports continued severe pain just under the left kneecap and over the kneecap itself.  She states the bruising is starting to improve but she is also concerned with the amount of swelling that she has in the knee.  No numbness, knee instability, tingling, burning.  She does apply ice, elevate, and wear knee brace intermittently for support.  She takes ibuprofen as needed for her symptoms.  MRI requested at this time.  Consider orthopedic referral pending MRI results.  Patient is retiring from her company and is not in need of any light duty restrictions at this time.  Plan of care discussed with patient.    ROS: All systems were reviewed on intake form, form was reviewed and signed. See scanned documents in media. Pertinent positives and negatives included in HPI.    PMH: No pertinent past medical history to this problem  MEDS: Medications were reviewed in Epic  ALLERGIES:   Allergies   Allergen Reactions    Cephalexin Rash and Unspecified     SOCHX: Works as a   at [employer]  FH: No pertinent family history to this problem       Objective:     /72 (BP Location: Right arm, Patient Position: Sitting, BP Cuff Size: Adult)   Pulse 67   Temp 36.3 °C (97.3 °F) (Temporal)   Resp 16   Ht 1.6 m (5' 3\")   Wt 72.1 kg (159 lb)   LMP  (LMP Unknown)   SpO2 96%   BMI 28.17 kg/m²     [unfilled]    Left knee: Ecchymosis and swelling over the tibial tuberosity.  Pain to the patellar tendon with extension and flexion.  Pain with standing.  No malalignment.  No small abrasion to the left kneecap. No gait abnormality  Right knee: Mild discomfort with " ambulation walking but no bony tenderness.  No underlying ecchymosis.      Assessment/Plan:       1. Ground-level fall  - MR-KNEE-W/O LEFT; Future  - Referral to Radiology    2. Contusion of knee, unspecified laterality, subsequent encounter  - MR-KNEE-W/O LEFT; Future  - Referral to Radiology    3. Contusion of left knee, subsequent encounter  - MR-KNEE-W/O LEFT; Future  - Referral to Radiology          Differential diagnosis, natural history, supportive care, and indications for immediate follow-up discussed.    Approximately 30 minutes were spent in reviewing notes, preparing for visit, obtaining history, exam and evaluation, patient counseling/education and post visit documentation/orders.

## 2025-03-26 NOTE — LETTER
PHYSICIAN’S AND CHIROPRACTIC PHYSICIAN'S   PROGRESS REPORT   CERTIFICATION OF DISABILITY Claim Number:     Social Security Number:    Patient’s Name: Inessa Mcintyre Date of Injury: 3/19/2025   Employer: [employer] Name of MCO (if applicable):      Patient’s Job Description/Occupation:        Previous Injuries/Diseases/Surgeries Contributing to the Condition:         Diagnosis: (W18.30XA) Ground-level fall  (S80.00XD) Contusion of knee, unspecified laterality, subsequent encounter  (S80.02XD) Contusion of left knee, subsequent encounter      Related to the Industrial Injury? Yes     Explain: DOI: 03/19/2025 . RADHA: She fell and landed on both knees but says her left one is hurting a lot more       Objective Medical Findings: Left knee: Ecchymosis and swelling over the tibial tuberosity.  Pain to the patellar tendon with extension and flexion.  Pain with standing.  No malalignment.  No small abrasion to the left kneecap. No gait abnormality  Right knee: Mild discomfort with ambulation walking but no bony tenderness.  No underlying ecchymosis.           None - Discharged                         Stable  Yes                 Ratable  No        Generally Improved                         Condition Worsened               X   Condition Same  May Have Suffered a Permanent Disability No     Treatment Plan:    Follow-up in 4 weeks  Continue with OTC Tylenol/Ibuprofen, RICE, heat application, and gentle ROM as tolerated  Knee brace as needed   Return to clinic sooner if needed for further evaluation and management of new or worsening symptoms           No Change in Therapy                  PT/OT Prescribed                      Medication May be Used While Working        Case Management                          PT/OT Discontinued    Consultation    Further Diagnostic Studies:    Prescription(s)      MRI of the knee         X  Released to FULL DUTY /No Restrictions on (Date):  3/26/2025    Certified  TOTALLY TEMPORARILY DISABLED (Indicate Dates) From:   To:      Released to RESTRICTED/Modified Duty on (Date): From:   To:    Restrictions Are:         No Sitting    No Standing    No Pulling Other:         No Bending at Waist     No Stooping     No Lifting        No Carrying     No Walking Lifting Restricted to (lbs.):          No Pushing        No Climbing     No Reaching Above Shoulders       Date of Next Visit:   04/23/2025  @9:30AM  Date of this Exam: 3/26/2025 Physician/Chiropractic Physician Name: EZ Carlson Physician/Chiropractic Physician Signature:  Joey Bland DO MPH     [place]:  [address] [place] [address], [address] [place], [place] [address] - Telephone [phone] [place]:  [address] [place] [address], [address] [place], [place] [address] - Telephone [phone]    [email or web address]  D-39 (Rev. 10/24)

## 2025-03-28 ENCOUNTER — OFFICE VISIT (OUTPATIENT)
Dept: MEDICAL GROUP | Facility: PHYSICIAN GROUP | Age: 65
End: 2025-03-28
Payer: MEDICARE

## 2025-03-28 VITALS
HEART RATE: 72 BPM | TEMPERATURE: 97.8 F | OXYGEN SATURATION: 96 % | SYSTOLIC BLOOD PRESSURE: 122 MMHG | HEIGHT: 63 IN | DIASTOLIC BLOOD PRESSURE: 74 MMHG | WEIGHT: 160.5 LBS | BODY MASS INDEX: 28.44 KG/M2 | RESPIRATION RATE: 16 BRPM

## 2025-03-28 DIAGNOSIS — R73.9 HYPERGLYCEMIA: ICD-10-CM

## 2025-03-28 DIAGNOSIS — E55.9 VITAMIN D DEFICIENCY: ICD-10-CM

## 2025-03-28 DIAGNOSIS — E78.00 PURE HYPERCHOLESTEROLEMIA: ICD-10-CM

## 2025-03-28 DIAGNOSIS — Z80.0 FAMILY HISTORY OF COLON CANCER: ICD-10-CM

## 2025-03-28 DIAGNOSIS — Z11.59 NEED FOR HEPATITIS C SCREENING TEST: ICD-10-CM

## 2025-03-28 PROCEDURE — 3074F SYST BP LT 130 MM HG: CPT | Performed by: FAMILY MEDICINE

## 2025-03-28 PROCEDURE — 99214 OFFICE O/P EST MOD 30 MIN: CPT | Performed by: FAMILY MEDICINE

## 2025-03-28 PROCEDURE — 3078F DIAST BP <80 MM HG: CPT | Performed by: FAMILY MEDICINE

## 2025-03-28 RX ORDER — LOVASTATIN 10 MG/1
10 TABLET ORAL NIGHTLY
Qty: 90 TABLET | Refills: 1 | Status: SHIPPED | OUTPATIENT
Start: 2025-03-28

## 2025-03-28 ASSESSMENT — FIBROSIS 4 INDEX: FIB4 SCORE: 1.195652173913043478

## 2025-03-28 NOTE — Clinical Note
REFERRAL APPROVAL NOTICE         Sent on March 28, 2025                   Inessa Mcintyre  [address] [address]                   Dear Ms. Mcintyre,    After a careful review of the medical information and benefit coverage, [facility] has processed your referral. See below for additional details.    If applicable, you must be actively enrolled with your insurance for coverage of the authorized service. If you have any questions regarding your coverage, please contact your insurance directly.    REFERRAL INFORMATION   Referral #:  [id number]  Referred-To Provider    Referred-By Provider:  Gastroenterology    Sirena Aguilar M.D.   [facility]      [address]  [facility] [id number] [phone] [address] [facility] [address]  [phone]    Referral Start Date:  03/28/2025  Referral End Date:   03/28/2026             SCHEDULING  If you do not already have an appointment, please call [phone] to make an appointment.     MORE INFORMATION  If you do not already have a [employer] account, sign up at: [email or web address]  You can access your medical information, make appointments, see lab results, billing information, and more.  If you have questions regarding this referral, please contact  the [facility] Referrals department at:             [phone]. Monday - Friday 8:00AM - 5:00PM.     Sincerely,    [facility]

## 2025-03-28 NOTE — PROGRESS NOTES
"Subjective:     CC:   Chief Complaint   Patient presents with    Follow-Up       HPI:   Inessa presents today for follow-up of her labs.  Patient did speak to her children who told her that her colonoscopy was done in 2018 and she is always normal we will need to request records.  Patient is here for follow-up of her labs along with her bone density    Past Medical History:   Diagnosis Date    Administrative encounter 08/02/2018    Bilateral acute suppurative otitis media 08/31/2018    Need for vaccination 08/02/2018    Pain of finger of left hand 09/02/2020    Preventative health care 08/02/2018       Social History     Tobacco Use    Smoking status: Never    Smokeless tobacco: Never   Vaping Use    Vaping status: Never Used   Substance Use Topics    Alcohol use: No     Comment: very rarely    Drug use: No       Current Outpatient Medications Ordered in Epic   Medication Sig Dispense Refill    lovastatin (MEVACOR) 10 MG tablet Take 1 Tablet by mouth every evening. 90 Tablet 1    traZODone (DESYREL) 50 MG Tab Take 50 mg by mouth every evening.      DULoxetine (CYMBALTA) 60 MG Cap DR Particles delayed-release capsule TAKE 1 CAPSULE BY MOUTH EVERY DAY 90 Cap 0    Multiple Vitamin (MULTI-VITAMIN DAILY PO) Take  by mouth. Fish oil, vitamin d       No current [facility]-ordered facility-administered medications on file.       Allergies:  Cephalexin    Health Maintenance: Completed    ROS:  Gen: no fevers/chills, no changes in weight  Eyes: no changes in vision  ENT: no sore throat, no hearing loss, no bloody nose  Pulm: no sob, no cough  CV: no chest pain, no palpitations  GI: no nausea/vomiting, no diarrhea  : no dysuria  Neuro: no headaches, no numbness/tingling  Heme/Lymph: no easy bruising    Objective:     Exam:  /74 (BP Location: Right arm, Patient Position: Sitting, BP Cuff Size: Adult)   Pulse 72   Temp 36.6 °C (97.8 °F) (Temporal)   Resp 16   Ht 1.6 m (5' 3\")   Wt 72.8 kg (160 lb 8 oz)   LMP  (LMP " Unknown)   SpO2 96%   BMI 28.43 kg/m²  Body mass index is 28.43 kg/m².    Gen: Alert and oriented, No apparent distress.  Skin: Warm and dry.  No obvious lesions.  Eyes: Sclera wnl Pupils normal in size  Lungs: Normal effort, CTA bilaterally, no wheezes, rhonchi, or rales  CV: Regular rate and rhythm. No murmurs, rubs, or gallops.  Musculoskeletal: Normal gait. No extremity cyanosis, clubbing, or edema.  Neuro: Oriented to person, place and time  Psych: Mood is wnl       Assessment & Plan:     64 y.o. female with the following -     1. Pure hypercholesterolemia  Patient feels that she is following a low-fat diet.  I did discuss with patient if that is the case I need to put her on a cholesterol-lowering agent.  Patient is agreeable for me to do this.  I did warn patient that she is to continue on the low-fat diet since on put in on a low-dose cholesterol-lowering agent and if she starts eating more fatty foods her cholesterol can go up.  Would recommend rechecking this again in 3 months  - Comp Metabolic Panel; Future  - Lipid Profile; Future    2. Hyperglycemia  Patient's hemoglobin A1c is 6.  Would recommend decreasing breads and also patient has a tendency to like to eat a bunch of fruit she should decrease the amount she is eating.  Also would like patient to focus on trying to lose weight by portion control and increase activity  - HEMOGLOBIN A1C; Future    3. Vitamin D deficiency  Patient did buy a vitamin D supplement so we will recheck this again in 3 months  - VITAMIN D,25 HYDROXY (DEFICIENCY); Future    4. Need for hepatitis C screening test  - HCV Scrn ( [id number] 1xLife - Medicare Patients Only); Future    Other orders  - lovastatin (MEVACOR) 10 MG tablet; Take 1 Tablet by mouth every evening.  Dispense: 90 Tablet; Refill: 1       Return in about 3 months (around 2025), or if symptoms worsen or fail to improve.    Please note that this dictation was created using voice recognition software. I  have made every reasonable attempt to correct obvious errors, but I expect that there are errors of grammar and possibly content that I did not discover before finalizing the note.

## 2025-03-28 NOTE — LETTER
[facility]  Sirena Aguilar M.D.  [address]  [facility] [address]  Fax: [phone]   Authorization for Release/Disclosure of   Protected Health Information   Name: JIN HOGUE : 1960 SSN: [id number]   Address: [address] [place] [id number] Phone:    There are no phone numbers on file.   I authorize the entity listed below to release/disclose the PHI below to:   [facility]/Sirena Aguilar M.D. and Sirena Aguilar M.D.   Provider or Entity Name: [facility]     Address   [facility], [facility], [address] [place] [address]   Phone: [phone]      Fax:  [phone]     Reason for request: continuity of care   Information to be released:    [ XXX ] LAST COLONOSCOPY,  including any PATH REPORT and follow-up  [  ] LAST FIT/COLOGUARD RESULT [  ] LAST DEXA  [  ] LAST MAMMOGRAM  [  ] LAST PAP  [  ] LAST LABS [  ] RETINA EXAM REPORT  [  ] IMMUNIZATION RECORDS  [  ] Release all info      [  ] Check here and initial the line next to each item to release ALL health information INCLUDING  _____ Care and treatment for drug and / or alcohol abuse  _____ HIV testing, infection status, or AIDS  _____ Genetic Testing    DATES OF SERVICE OR TIME PERIOD TO BE DISCLOSED: _____________  I understand and acknowledge that:  * This Authorization may be revoked at any time by you in writing, except if your health information has already been used or disclosed.  * Your health information that will be used or disclosed as a result of you signing this authorization could be re-disclosed by the recipient. If this occurs, your re-disclosed health information may no longer be protected by State or Federal laws.  * You may refuse to sign this Authorization. Your refusal will not affect your ability to obtain treatment.  * This Authorization becomes effective upon signing and will  on (date) __________.      If no date is indicated, this Authorization will  one (1) year from  the signature date.    Name: Inessa Mcintyre  Signature: Date:   3/28/2025     PLEASE FAX REQUESTED RECORDS BACK TO: [phone]

## 2025-04-01 ENCOUNTER — APPOINTMENT (OUTPATIENT)
Dept: FAMILY PLANNING/WOMEN'S HEALTH CLINIC | Facility: PHYSICIAN GROUP | Age: 65
End: 2025-04-01
Attending: FAMILY MEDICINE
Payer: MEDICARE

## 2025-04-01 VITALS
DIASTOLIC BLOOD PRESSURE: 80 MMHG | TEMPERATURE: 97 F | SYSTOLIC BLOOD PRESSURE: 122 MMHG | OXYGEN SATURATION: 97 % | HEART RATE: 74 BPM | BODY MASS INDEX: 27.93 KG/M2 | HEIGHT: 63 IN | WEIGHT: 157.6 LBS

## 2025-04-01 DIAGNOSIS — E55.9 VITAMIN D DEFICIENCY: ICD-10-CM

## 2025-04-01 DIAGNOSIS — E78.00 PURE HYPERCHOLESTEROLEMIA: ICD-10-CM

## 2025-04-01 DIAGNOSIS — Z91.81 RISK FOR FALLS: ICD-10-CM

## 2025-04-01 DIAGNOSIS — G47.09 OTHER INSOMNIA: ICD-10-CM

## 2025-04-01 DIAGNOSIS — F33.0 MILD EPISODE OF RECURRENT MAJOR DEPRESSIVE DISORDER (HCC): ICD-10-CM

## 2025-04-01 PROBLEM — G89.29 CHRONIC RIGHT SHOULDER PAIN: Status: RESOLVED | Noted: 2020-02-20 | Resolved: 2025-04-01

## 2025-04-01 PROBLEM — M25.511 CHRONIC RIGHT SHOULDER PAIN: Status: RESOLVED | Noted: 2020-02-20 | Resolved: 2025-04-01

## 2025-04-01 PROCEDURE — 3074F SYST BP LT 130 MM HG: CPT

## 2025-04-01 PROCEDURE — G0439 PPPS, SUBSEQ VISIT: HCPCS

## 2025-04-01 PROCEDURE — 1125F AMNT PAIN NOTED PAIN PRSNT: CPT

## 2025-04-01 PROCEDURE — 3079F DIAST BP 80-89 MM HG: CPT

## 2025-04-01 SDOH — ECONOMIC STABILITY: HOUSING INSECURITY: IN THE LAST 12 MONTHS, WAS THERE A TIME WHEN YOU WERE NOT ABLE TO PAY THE MORTGAGE OR RENT ON TIME?: NO

## 2025-04-01 SDOH — ECONOMIC STABILITY: HOUSING INSECURITY: AT ANY TIME IN THE PAST 12 MONTHS, WERE YOU HOMELESS OR LIVING IN A SHELTER (INCLUDING NOW)?: NO

## 2025-04-01 SDOH — ECONOMIC STABILITY: FOOD INSECURITY: HOW HARD IS IT FOR YOU TO PAY FOR THE VERY BASICS LIKE FOOD, HOUSING, MEDICAL CARE, AND HEATING?: NOT HARD AT ALL

## 2025-04-01 SDOH — ECONOMIC STABILITY: FOOD INSECURITY: WITHIN THE PAST 12 MONTHS, YOU WORRIED THAT YOUR FOOD WOULD RUN OUT BEFORE YOU GOT THE MONEY TO BUY MORE.: NEVER TRUE

## 2025-04-01 SDOH — ECONOMIC STABILITY: FOOD INSECURITY: WITHIN THE PAST 12 MONTHS, THE FOOD YOU BOUGHT JUST DIDN'T LAST AND YOU DIDN'T HAVE MONEY TO GET MORE.: NEVER TRUE

## 2025-04-01 SDOH — ECONOMIC STABILITY: TRANSPORTATION INSECURITY: IN THE PAST 12 MONTHS, HAS LACK OF TRANSPORTATION KEPT YOU FROM MEDICAL APPOINTMENTS OR FROM GETTING MEDICATIONS?: NO

## 2025-04-01 ASSESSMENT — PATIENT HEALTH QUESTIONNAIRE - PHQ9: CLINICAL INTERPRETATION OF PHQ2 SCORE: 0

## 2025-04-01 ASSESSMENT — ACTIVITIES OF DAILY LIVING (ADL)
BATHING_REQUIRES_ASSISTANCE: 0
LACK_OF_TRANSPORTATION: NO

## 2025-04-01 ASSESSMENT — FIBROSIS 4 INDEX: FIB4 SCORE: 1.195652173913043478

## 2025-04-01 ASSESSMENT — PAIN SCALES - GENERAL: PAINLEVEL_OUTOF10: 7=MODERATE-SEVERE PAIN

## 2025-04-01 ASSESSMENT — ENCOUNTER SYMPTOMS: GENERAL WELL-BEING: EXCELLENT

## 2025-04-01 NOTE — ASSESSMENT & PLAN NOTE
Chronic, stable. Continue with current defined treatment plan: Multiple Vitamin (MULTI-VITAMIN DAILY PO). Follow-up at least annually.

## 2025-04-01 NOTE — PROGRESS NOTES
Comprehensive Health Assessment Program     Inessa Mcintyre is a 64 y.o. here for her comprehensive health assessment.    Patient Active Problem List    Diagnosis Date Noted    Hyperglycemia 03/28/2025    Vitamin D deficiency 03/28/2025    BMI 27.0-27.9,adult 08/05/2024    Other insomnia 08/05/2024    Screening mammogram, encounter for 08/05/2024    Risk for falls 08/05/2024    De Quervain's tenosynovitis 09/02/2020    Pure hypercholesterolemia 08/02/2018    Mild episode of recurrent major depressive disorder (HCC) 08/02/2018    Atypical chest pain 11/11/2014       Current Outpatient Medications   Medication Sig Dispense Refill    lovastatin (MEVACOR) 10 MG tablet Take 1 Tablet by mouth every evening. 90 Tablet 1    traZODone (DESYREL) 50 MG Tab Take 50 mg by mouth every evening.      DULoxetine (CYMBALTA) 60 MG Cap DR Particles delayed-release capsule TAKE 1 CAPSULE BY MOUTH EVERY DAY 90 Cap 0    Multiple Vitamin (MULTI-VITAMIN DAILY PO) Take  by mouth. Fish oil, vitamin d       No current facility-administered medications for this visit.          Current supplements as per medication list.     Allergies:   Cephalexin  Social History     Tobacco Use    Smoking status: Never    Smokeless tobacco: Never   Vaping Use    Vaping status: Never Used   Substance Use Topics    Alcohol use: No    Drug use: No     Family History   Problem Relation Age of Onset    Hypertension Mother     Alzheimer's Disease Mother     Alcohol/Drug Father         ETOH    Cancer Brother 70        colon     Alzheimer's Disease Brother     Heart Disease Brother      Inessa  has a past medical history of Administrative encounter (08/02/2018), Bilateral acute suppurative otitis media (08/31/2018), Chronic right shoulder pain (02/20/2020), Need for vaccination (08/02/2018), Pain of finger of left hand (09/02/2020), and Preventative health care (08/02/2018).   Past Surgical History:   Procedure Laterality Date    ABDOMINAL HYSTERECTOMY TOTAL       BLADDER SUSPENSION      FOOT SURGERY Right     TUBAL LIGATION         Screening:  In the last six months have you experienced any leakage of urine? No    Depression Screening  Little interest or pleasure in doing things?  0 - not at all  Feeling down, depressed , or hopeless? 0 - not at all  Patient Health Questionnaire Score: 0     If depressive symptoms identified deferred to follow up visit unless specifically addressed in assessment and plan.    Interpretation of PHQ-9 Total Score   Score Severity   1-4 No Depression   5-9 Mild Depression   10-14 Moderate Depression   15-19 Moderately Severe Depression   20-27 Severe Depression    Screening for Cognitive Impairment  Do you or any of your friends or family members have any concern about your memory? No  Three Minute Recall (Village, Kitchen, Baby) 3/3    Andrew clock face with all 12 numbers and set the hands to show 10 minutes past 11.  Yes 5/5  Cognitive concerns identified deferred for follow up unless specifically addressed in assessment and plan.    Fall Risk Assessment  Has the patient had two or more falls in the last year or any fall with injury in the last year?  Yes    Safety Assessment  Do you always wear your seatbelt?  Yes  Any changes to home needed to function safely? No  Difficulty hearing.  No  Patient counseled about all safety risks that were identified.    Functional Assessment ADLs  Are there any barriers preventing you from cooking for yourself or meeting nutritional needs?  No.    Are there any barriers preventing you from driving safely or obtaining transportation?  No.    Are there any barriers preventing you from using a telephone or calling for help?  No    Are there any barriers preventing you from shopping?  No.    Are there any barriers preventing you from taking care of your own finances?  No    Are there any barriers preventing you from managing your medications?  No    Are there any barriers preventing you from showering, bathing or  dressing yourself? No    Are there any barriers preventing you from doing housework or laundry? No  Are there any barriers preventing you from using the toilet?No  Are you currently engaging in any exercise or physical activity?  Yes. Elliptical qod     Self-Assessment of Health  What is your perception of your health? Excellent    Do you sleep more than six hours a night? Yes    In the past 7 days, how much did pain keep you from doing your normal work? None    Do you spend quality time with family or friends (virtually or in person)? Yes    Do you usually eat a heart healthy diet that constists of a variety of fruits, vegetables, whole grains and fiber? Yes    Do you eat foods high in fat and/or Fast Food more than three times per week? No    How concerned are you that your medical conditions are not being well managed? Not at all    Are you worried that in the next 2 months, you may not have stable housing that you own, rent, or stay in as part of a household? No      Advance Care Planning  Do you have an Advance Directive, Living Will, Durable Power of , or POLST? No                 Health Maintenance Summary            Current Care Gaps       COVID-19 Vaccine (2024- season) Overdue since 2024      10/09/2023  Imm Admin: COVID-19, mRNA, LNP-S, PF, donato-sucrose, 30 mcg/0.3 mL    10/24/2022  Imm Admin: PFIZER BIVALENT SARS-COV-2 VACCINE (12+)    2021  Imm Admin: PFIZER PURPLE CAP SARS-COV-2 VACCINATION (12+)    2021  Imm Admin: PFIZER PURPLE CAP SARS-COV-2 VACCINATION (12+)              Hepatitis C Screening (Once) Never done      2025  Order placed for HCV Scrn ( [id number] 1xLife - Medicare Patients Only) by Sirena Aguilar M.D.              Pneumococcal Vaccine: 50+ Years (1 of 1 - PCV) Never done     No completion history exists for this topic.                      Needs Review       Colorectal Cancer Screening (Colonoscopy - Every 10 Years) Tentatively due on 2028       11/02/2018  REFERRAL TO GI FOR COLONOSCOPY    07/09/2009  COLONOSCOPY RESULTS    07/09/2009  REFERRAL TO GI FOR COLONOSCOPY                      Awaiting Completion       Mammogram (Yearly) Scheduled for 4/3/2025      03/07/2025  Order placed for MA-SCREENING MAMMO BILAT W/TOMOSYNTHESIS W/CAD by Lilly Thurston    09/02/2023  MA-SCREENING MAMMO BILAT W/TOMOSYNTHESIS W/CAD    08/18/2018  MA-MAMMO SCREENING BILAT W/SHELLY W/O CAD                      Upcoming       IMM DTaP/Tdap/Td Vaccine (2 - Td or Tdap) Next due on 8/2/2028 08/02/2018  Imm Admin: Tdap Vaccine              Bone Density Scan (Every 5 Years) Next due on 3/14/2030      03/14/2025  DS-BONE DENSITY STUDY (DEXA)                      Completed or No Longer Recommended       Influenza Vaccine (Series Information) Completed      02/28/2025  Imm Admin: Influenza high-dose trivalent (PF)    10/09/2023  Imm Admin: Influenza Vaccine Quad Inj (Pf)    10/24/2022  Imm Admin: Influenza Vaccine Quad Inj (Pf)    10/21/2020  Imm Admin: Influenza Vaccine Quad Inj (Pf)    02/20/2020  Imm Admin: Influenza Vaccine Quad Inj (Pf)     Only the first 5 history entries have been loaded, but more history exists.            Zoster (Shingles) Vaccines (Series Information) Completed      01/03/2023  Imm Admin: Zoster Vaccine Recombinant (RZV) (SHINGRIX)    09/26/2022  Imm Admin: Zoster Vaccine Recombinant (RZV) (SHINGRIX)              Hepatitis A Vaccine (Hep A) (Series Information) Aged Out      No completion history exists for this topic.              Hepatitis B Vaccine (Hep B) (Series Information) Aged Out     No completion history exists for this topic.              HPV Vaccines (Series Information) Aged Out     No completion history exists for this topic.              Polio Vaccine (Inactivated Polio) (Series Information) Aged Out     No completion history exists for this topic.              Meningococcal Immunization (Series Information) Aged Out     No completion  "history exists for this topic.              Cervical Cancer Screening  Discontinued        Frequency changed to Never automatically (Topic No Longer Applies)    08/20/2018  PATHOLOGY GYN SPECIMEN    08/20/2018  THINPREP PAP WITH HPV              HIV Screening  Discontinued     No completion history exists for this topic.              Annual Wellness Visit  Discontinued        Frequency changed to Never automatically (Topic No Longer Applies)    04/01/2025  Level of Service: AK ANNUAL WELLNESS VISIT-INCLUDES PPPS SUBSEQUE*    08/05/2024  Level of Service: AK ANNUAL WELLNESS VISIT-INCLUDES PPPS SUBSEQUE*                            Patient Care Team:  Sirena Aguilar M.D. as PCP - General (Family Medicine)      Financial Resource Strain: Low Risk  (4/1/2025)    Overall Financial Resource Strain (CARDIA)     Difficulty of Paying Living Expenses: Not hard at all      Transportation Needs: No Transportation Needs (4/1/2025)    PRAPARE - Transportation     Lack of Transportation (Medical): No     Lack of Transportation (Non-Medical): No      Food Insecurity: No Food Insecurity (4/1/2025)    Hunger Vital Sign     Worried About Running Out of Food in the Last Year: Never true     Ran Out of Food in the Last Year: Never true        Encounter Vitals  Temperature: 36.1 °C (97 °F)  Temp src: Temporal  Blood Pressure : 122/80  Pulse: 74  Pulse Oximetry: 97 %  Weight: 71.5 kg (157 lb 9.6 oz)  Height: 160 cm (5' 3\")  BMI (Calculated): 27.92  Pain Score: 7=Moderate-Severe Pain     ROS:  No fever, chills, nausea, vomiting, diarrhea, chest pain or shortness of breath. See HPI.    Physical Exam:  Constitutional: NAD  HENMT: NC/AT, PERRLA, EOMI, OP clear, TM's clear, no lymphadenopathy, no thyromegaly.  No JVD.  Cardiovascular: RRR, No m/r/g  Lungs: CTAB, no w/r/r  Extremities: 2+ DP, PT and Radial pulses bilaterally. No c/c/e  Skin: No legions notes  Neurologic: Alert & oriented x3, CN II-XII grossly intact    Assessment and Plan. The " following treatment and monitoring plan is recommended:    Risk for falls  Chronic, stable.  The patient reports that her last fall was on March 19 th 2025 while she was at work. Discussed fall safety.  Follow-up at least annually.      Other insomnia  Chronic, stable.  The patient reports that she is able to get 7 hours of restful sleep.  Continue with current defined treatment plan: traZODone (DESYREL) 50 MG Tab. Follow-up at least annually.      Mild episode of recurrent major depressive disorder (HCC)  Chronic, stable.  PHQ-9 score today is 0. The patient reports stable mood most of the time.  She denies SI/HI. Not currently in counseling.   Continue with current defined treatment plan: DULoxetine (CYMBALTA) 60 MG Cap DR Particles delayed-release capsule. Follow-up at least annually.      Pure hypercholesterolemia   Latest Reference Range & Units 02/28/25 11:07   Cholesterol,Tot 100 - 199 mg/dL 229 (H)   Triglycerides 0 - 149 mg/dL 173 (H)   HDL >=40 mg/dL 44   LDL <100 mg/dL 150 (H)   (H): Data is abnormally high    Chronic, stable. The patient denies any side effects from the current medication. Continue with current defined treatment plan: lovastatin (MEVACOR) 10 MG tablet. Follow-up at least annually.      Vitamin D deficiency  Chronic, stable. Continue with current defined treatment plan: Multiple Vitamin (MULTI-VITAMIN DAILY PO). Follow-up at least annually.      Services suggested: No services needed at this time  Health Care Screening: Age-appropriate preventive services recommended by USPTF and ACIP covered by Medicare were discussed today. Services ordered if indicated and agreed upon by the patient.  Referrals offered: Community-based lifestyle interventions to reduce health risks and promote self-management and wellness, fall prevention, nutrition, physical activity, tobacco-use cessation, weight loss, and mental health services as per orders if indicated.    Discussion today about general wellness  and lifestyle habits:    Prevent falls and reduce trip hazards; Cautioned about securing or removing rugs.  Have a working fire alarm and carbon monoxide detector.  Engage in regular physical activity and social activities.    Follow-up: Return for appointment with Primary Care Provider as needed.

## 2025-04-01 NOTE — ASSESSMENT & PLAN NOTE
Latest Reference Range & Units 02/28/25 11:07   Cholesterol,Tot 100 - 199 mg/dL 229 (H)   Triglycerides 0 - 149 mg/dL 173 (H)   HDL >=40 mg/dL 44   LDL <100 mg/dL 150 (H)   (H): Data is abnormally high    Chronic, stable. The patient denies any side effects from the current medication. Continue with current defined treatment plan: lovastatin (MEVACOR) 10 MG tablet. Follow-up at least annually.

## 2025-04-01 NOTE — ASSESSMENT & PLAN NOTE
Chronic, stable.  The patient reports that her last fall was on March 19 th 2025 while she was at work. Discussed fall safety.  Follow-up at least annually.

## 2025-04-01 NOTE — ASSESSMENT & PLAN NOTE
Chronic, stable.  PHQ-9 score today is 0. The patient reports stable mood most of the time.  She denies SI/HI. Not currently in counseling.   Continue with current defined treatment plan: DULoxetine (CYMBALTA) 60 MG Cap DR Particles delayed-release capsule. Follow-up at least annually.

## 2025-04-01 NOTE — ASSESSMENT & PLAN NOTE
Chronic, stable.  The patient reports that she is able to get 7 hours of restful sleep.  Continue with current defined treatment plan: traZODone (DESYREL) 50 MG Tab. Follow-up at least annually.

## 2025-04-03 ENCOUNTER — HOSPITAL ENCOUNTER (OUTPATIENT)
Dept: RADIOLOGY | Facility: MEDICAL CENTER | Age: 65
End: 2025-04-03
Attending: FAMILY MEDICINE
Payer: MEDICARE

## 2025-04-03 DIAGNOSIS — Z12.31 VISIT FOR SCREENING MAMMOGRAM: ICD-10-CM

## 2025-04-03 PROCEDURE — 77067 SCR MAMMO BI INCL CAD: CPT

## 2025-04-10 ENCOUNTER — RESULTS FOLLOW-UP (OUTPATIENT)
Dept: MEDICAL GROUP | Facility: PHYSICIAN GROUP | Age: 65
End: 2025-04-10

## 2025-04-22 ENCOUNTER — TELEPHONE (OUTPATIENT)
Dept: OCCUPATIONAL MEDICINE | Facility: CLINIC | Age: 65
End: 2025-04-22
Payer: MEDICARE

## 2025-04-23 ENCOUNTER — OCCUPATIONAL MEDICINE (OUTPATIENT)
Dept: OCCUPATIONAL MEDICINE | Facility: CLINIC | Age: 65
End: 2025-04-23
Payer: COMMERCIAL

## 2025-04-23 VITALS
HEIGHT: 63 IN | BODY MASS INDEX: 28.17 KG/M2 | RESPIRATION RATE: 16 BRPM | OXYGEN SATURATION: 98 % | SYSTOLIC BLOOD PRESSURE: 128 MMHG | TEMPERATURE: 98.2 F | HEART RATE: 78 BPM | DIASTOLIC BLOOD PRESSURE: 88 MMHG | WEIGHT: 159 LBS

## 2025-04-23 DIAGNOSIS — S80.02XD CONTUSION OF LEFT KNEE, SUBSEQUENT ENCOUNTER: ICD-10-CM

## 2025-04-23 DIAGNOSIS — S80.00XD CONTUSION OF KNEE, UNSPECIFIED LATERALITY, SUBSEQUENT ENCOUNTER: ICD-10-CM
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
## 2025-04-23 PROCEDURE — 99213 OFFICE O/P EST LOW 20 MIN: CPT | Performed by: NURSE PRACTITIONER

## 2025-04-23 ASSESSMENT — FIBROSIS 4 INDEX: FIB4 SCORE: 1.195652173913043478

## 2025-04-23 ASSESSMENT — ENCOUNTER SYMPTOMS
CARDIOVASCULAR NEGATIVE: 1
RESPIRATORY NEGATIVE: 1
TINGLING: 0
CONSTITUTIONAL NEGATIVE: 1
SENSORY CHANGE: 0
WEAKNESS: 0
MYALGIAS: 1
PSYCHIATRIC NEGATIVE: 1

## 2025-04-23 ASSESSMENT — PAIN SCALES - GENERAL: PAINLEVEL_OUTOF10: NO PAIN

## 2025-04-23 NOTE — LETTER
PHYSICIAN’S AND CHIROPRACTIC PHYSICIAN'S   PROGRESS REPORT   CERTIFICATION OF DISABILITY Claim Number:     Social Security Number:    Patient’s Name: Inessa Mcintyre Date of Injury: 3/19/2025   Employer: [employer] Name of MCO (if applicable):      Patient’s Job Description/Occupation:        Previous Injuries/Diseases/Surgeries Contributing to the Condition:         Diagnosis: (W18.30XA) Ground-level fall  (S80.00XD) Contusion of knee, unspecified laterality, subsequent encounter  (S80.02XD) Contusion of left knee, subsequent encounter      Related to the Industrial Injury? Yes     Explain: DOI: 03/19/2025 . RADHA: She fell and landed on both knees but says her left one is hurting a lot more       Objective Medical Findings: Left knee: Ecchymosis and swelling over the tibial tuberosity.  Pain to the patellar tendon with extension and flexion.  Pain with standing.  No malalignment.  There is  bony bump that is tender to touch. No small abrasion to the left kneecap. No gait abnormality  Right knee: No discomfort with ambulation walking but no bony tenderness.  No underlying ecchymosis.           None - Discharged                         Stable  Yes                 Ratable  No     X   Generally Improved                         Condition Worsened               X   Condition Same  May Have Suffered a Permanent Disability No     Treatment Plan:    Follow-up in 4 weeks  Continue with OTC Tylenol/ibuprofen, RICE, stretching and gentle ROM as tolerated  Return to clinic sooner if needed for further evaluation and management of new or worsening symptoms           No Change in Therapy                  PT/OT Prescribed                      Medication May be Used While Working        Case Management                          PT/OT Discontinued    Consultation    Further Diagnostic Studies:    Prescription(s)      MRI of the left knee pending        X  Released to FULL DUTY /No Restrictions on (Date):   4/23/2025    Certified TOTALLY TEMPORARILY DISABLED (Indicate Dates) From:   To:      Released to RESTRICTED/Modified Duty on (Date): From:   To:    Restrictions Are:         No Sitting    No Standing    No Pulling Other:         No Bending at Waist     No Stooping     No Lifting        No Carrying     No Walking Lifting Restricted to (lbs.):          No Pushing        No Climbing     No Reaching Above Shoulders       Date of Next Visit:   5/21/2025 AT 4:30 PM Date of this Exam: 4/23/2025 Physician/Chiropractic Physician Name: EZ Carlson Physician/Chiropractic Physician Signature:  Joey Bland DO MPH     [place]:  [address] [place] [address], [address] [place], [place] [address] - Telephone [phone] [place]:  [address] [place] [address], [address] [place], [place] [address] - Telephone [phone]    [email or web address]  D-39 (Rev. 10/24)

## 2025-04-23 NOTE — PROGRESS NOTES
"Subjective:     Inessa Mcintyre is a 64 y.o. female who presents for Follow-Up ( [facility] DOI 3/19/25 R/L Knees - Better - Exam Room 16/)    DOI: 03/19/2025 . RADHA: She fell and landed on both knees but says her left one is hurting a lot more      The patient states that the right knee is better.  Left knee is improving but she still has continued severe pain if she goes to kneel on it or touches it.  She is no longer experiencing  bruising.  No numbness, knee instability, tingling, burning.  She does apply ice, elevate, and wear knee brace intermittently for support.  She takes ibuprofen as needed for her symptoms.  MRI pending at this time.  Consider orthopedic referral pending MRI results.  Patient has retiring from her company and is not in need of any light duty restrictions at this time.  Plan of care discussed with patient.    Review of Systems   Constitutional: Negative.    Respiratory: Negative.     Cardiovascular: Negative.    Musculoskeletal:  Positive for joint pain and myalgias.        Bony lump to the left kneecap.   Skin: Negative.    Neurological:  Negative for tingling, sensory change and weakness.   Psychiatric/Behavioral: Negative.         SOCHX: Works as a   at [employer]  FH: No pertinent family history to this problem       Objective:     /88   Pulse 78   Temp 36.8 °C (98.2 °F) (Temporal)   Resp 16   Ht 1.6 m (5' 3\")   Wt 72.1 kg (159 lb)   LMP  (LMP Unknown)   SpO2 98%   BMI 28.17 kg/m²     Constitutional: Patient is in no acute distress. Appears well-developed and well-nourished.   Cardiovascular: Normal rate.    Pulmonary/Chest: Effort normal. No respiratory distress.   Neurological: Patient is alert and oriented to person, place, and time.   Skin: Skin is warm and dry.   Psychiatric: Normal mood and affect. Behavior is normal.     Left knee: Ecchymosis and swelling over the tibial tuberosity.  Pain to the patellar tendon with extension and flexion.  Pain with " standing.  No malalignment.  There is  bony bump that is tender to touch. No small abrasion to the left kneecap. No gait abnormality  Right knee: No discomfort with ambulation walking but no bony tenderness.  No underlying ecchymosis.      Assessment/Plan:       1. Ground-level fall    2. Contusion of knee, unspecified laterality, subsequent encounter    3. Contusion of left knee, subsequent encounter    Follow-up in 4 weeks  Continue with OTC Tylenol/ibuprofen, RICE, stretching and gentle ROM as tolerated  Return to clinic sooner if needed for further evaluation and management of new or worsening symptoms            Differential diagnosis, natural history, supportive care, and indications for immediate follow-up discussed.    Approximately 25 minutes was spent in preparing for visit, obtaining history, exam and evaluation, patient counseling/education and post visit documentation/orders.

## 2025-05-20 ENCOUNTER — TELEPHONE (OUTPATIENT)
Dept: OCCUPATIONAL MEDICINE | Facility: CLINIC | Age: 65
End: 2025-05-20
Payer: MEDICARE

## 2025-06-27 ENCOUNTER — OFFICE VISIT (OUTPATIENT)
Dept: MEDICAL GROUP | Facility: PHYSICIAN GROUP | Age: 65
End: 2025-06-27
Payer: MEDICARE

## 2025-06-27 VITALS
HEART RATE: 82 BPM | BODY MASS INDEX: 27.32 KG/M2 | HEIGHT: 63 IN | RESPIRATION RATE: 16 BRPM | OXYGEN SATURATION: 96 % | TEMPERATURE: 97.9 F | SYSTOLIC BLOOD PRESSURE: 120 MMHG | WEIGHT: 154.2 LBS | DIASTOLIC BLOOD PRESSURE: 62 MMHG

## 2025-06-27 DIAGNOSIS — M65.351 TRIGGER FINGER OF ALL DIGITS OF BOTH HANDS: ICD-10-CM

## 2025-06-27 DIAGNOSIS — M65.342 TRIGGER FINGER OF ALL DIGITS OF BOTH HANDS: ICD-10-CM

## 2025-06-27 DIAGNOSIS — M25.562 CHRONIC PAIN OF LEFT KNEE: ICD-10-CM

## 2025-06-27 DIAGNOSIS — M65.332 TRIGGER FINGER OF ALL DIGITS OF BOTH HANDS: ICD-10-CM

## 2025-06-27 DIAGNOSIS — M65.352 TRIGGER FINGER OF ALL DIGITS OF BOTH HANDS: ICD-10-CM

## 2025-06-27 DIAGNOSIS — E78.00 PURE HYPERCHOLESTEROLEMIA: Primary | ICD-10-CM

## 2025-06-27 DIAGNOSIS — M65.341 TRIGGER FINGER OF ALL DIGITS OF BOTH HANDS: ICD-10-CM

## 2025-06-27 DIAGNOSIS — M65.311 TRIGGER FINGER OF ALL DIGITS OF BOTH HANDS: ICD-10-CM

## 2025-06-27 DIAGNOSIS — M65.321 TRIGGER FINGER OF ALL DIGITS OF BOTH HANDS: ICD-10-CM

## 2025-06-27 DIAGNOSIS — M65.322 TRIGGER FINGER OF ALL DIGITS OF BOTH HANDS: ICD-10-CM

## 2025-06-27 DIAGNOSIS — M65.312 TRIGGER FINGER OF ALL DIGITS OF BOTH HANDS: ICD-10-CM

## 2025-06-27 DIAGNOSIS — M65.331 TRIGGER FINGER OF ALL DIGITS OF BOTH HANDS: ICD-10-CM

## 2025-06-27 DIAGNOSIS — G89.29 CHRONIC PAIN OF LEFT KNEE: ICD-10-CM

## 2025-06-27 PROCEDURE — 99214 OFFICE O/P EST MOD 30 MIN: CPT | Performed by: FAMILY MEDICINE

## 2025-06-27 PROCEDURE — 3074F SYST BP LT 130 MM HG: CPT | Performed by: FAMILY MEDICINE

## 2025-06-27 PROCEDURE — 3078F DIAST BP <80 MM HG: CPT | Performed by: FAMILY MEDICINE

## 2025-06-27 RX ORDER — MELOXICAM 15 MG/1
1 TABLET ORAL DAILY
COMMUNITY
End: 2025-06-27

## 2025-06-27 RX ORDER — EZETIMIBE 10 MG/1
10 TABLET ORAL DAILY
Qty: 90 TABLET | Refills: 0 | Status: SHIPPED | OUTPATIENT
Start: 2025-06-27

## 2025-06-27 ASSESSMENT — FIBROSIS 4 INDEX: FIB4 SCORE: 1.195652173913043478

## 2025-06-27 NOTE — PROGRESS NOTES
"Subjective:     CC:   Chief Complaint   Patient presents with    Follow-Up       HPI:   Inessa presents today for follow-up patient did not get her lab work done because she stopped taking the lovastatin she states it caused severe leg cramping she could hardly walk so she decided to stop it.  Since stopping it things have resolved.  Patient also was requesting an MRI because she fell on her left knee about 3 months ago and sometimes it hurts when she walks.  I did recommend to patient that I need to refer her to Ortho for them to evaluate her knee.  Patient also states she has a number of trigger fingers on both hands that sometimes it is hard for her after she makes a fist to extend the fingers out.  Past Medical History[1]    Social History[2]    Current Medications and Prescriptions Ordered in Epic[3]    Allergies:  Cephalexin    Health Maintenance: Completed    ROS:  Gen: no fevers/chills, has lost 3 pounds in 2 months  Eyes: no changes in vision  ENT: no sore throat, no hearing loss, no bloody nose  Pulm: no sob, no cough  CV: no chest pain, no palpitations  GI: no nausea/vomiting, no diarrhea  : no dysuria  Neuro: no headaches, no numbness/tingling  Heme/Lymph: no easy bruising    Objective:     Exam:  /62 (BP Location: Left arm, Patient Position: Sitting, BP Cuff Size: Adult)   Pulse 82   Temp 36.6 °C (97.9 °F)   Resp 16   Ht 1.6 m (5' 3\")   Wt 69.9 kg (154 lb 3.2 oz)   LMP  (LMP Unknown)   SpO2 96%   BMI 27.32 kg/m²  Body mass index is 27.32 kg/m².    Gen: Alert and oriented, No apparent distress.  Skin: Warm and dry.  No obvious lesions.  Eyes: Sclera wnl Pupils normal in size  Lungs: Normal effort, CTA bilaterally, no wheezes, rhonchi, or rales  CV: Regular rate and rhythm. No murmurs, rubs, or gallops.  Musculoskeletal: Normal gait. No extremity cyanosis, clubbing, or edema.  Patient is having issues on extension after making a fist on her right hand with her 3rd and 4th finger and she is " having issues after making a fist on her left hand her third finger  Neuro: Oriented to person, place and time  Psych: Mood is wnl       Assessment & Plan:     64 y.o. female with the following -     1. Pure hypercholesterolemia  Patient is agreeable to start on Zetia instead we will check her lipid panel again in 2 months.  If it is not at goal we will need to consider referral to pharmacotherapy.    2. Trigger finger of all digits of both hands  Referral was written to Ortho    3. Chronic pain of left knee  Referral was written to Ortho    Other orders  - ezetimibe (ZETIA) 10 MG Tab; Take 1 Tablet by mouth every day.  Dispense: 90 Tablet; Refill: 0       Return in about 3 months (around 9/27/2025), or if symptoms worsen or fail to improve.    Please note that this dictation was created using voice recognition software. I have made every reasonable attempt to correct obvious errors, but I expect that there are errors of grammar and possibly content that I did not discover before finalizing the note.         [1]   Past Medical History:  Diagnosis Date    Administrative encounter 08/02/2018    Bilateral acute suppurative otitis media 08/31/2018    Chronic right shoulder pain 02/20/2020    Need for vaccination 08/02/2018    Pain of finger of left hand 09/02/2020    Preventative health care 08/02/2018   [2]   Social History  Tobacco Use    Smoking status: Never    Smokeless tobacco: Never   Vaping Use    Vaping status: Never Used   Substance Use Topics    Alcohol use: No    Drug use: No   [3]   Current Outpatient Medications Ordered in Epic   Medication Sig Dispense Refill    ezetimibe (ZETIA) 10 MG Tab Take 1 Tablet by mouth every day. 90 Tablet 0    traZODone (DESYREL) 50 MG Tab Take 50 mg by mouth every evening.      DULoxetine (CYMBALTA) 60 MG Cap DR Particles delayed-release capsule TAKE 1 CAPSULE BY MOUTH EVERY DAY 90 Cap 0    Multiple Vitamin (MULTI-VITAMIN DAILY PO) Take  by mouth. Fish oil, vitamin d       No  current Epic-ordered facility-administered medications on file.

## 2025-07-07 NOTE — Clinical Note
REFERRAL APPROVAL NOTICE         Sent on July 7, 2025                   Inessa Mcintyre  [address] [facility] [address]                   Dear Ms. Mcintyre,    After a careful review of the medical information and benefit coverage, [facility] has processed your referral. See below for additional details.    If applicable, you must be actively enrolled with your insurance for coverage of the authorized service. If you have any questions regarding your coverage, please contact your insurance directly.    REFERRAL INFORMATION   Referral #:  [id number]  Referred-To Department    Referred-By Provider:  Orthopedics    Sirena Aguilar M.D.   [facility]      [address]  [facility] [id number] [phone] [address] [place] [id number] [phone]    Referral Start Date:  06/27/2025  Referral End Date:   06/27/2026             SCHEDULING  If you do not already have an appointment, please call [phone] to make an appointment.     MORE INFORMATION  If you do not already have a [employer] account, sign up at: [email or web address]  You can access your medical information, make appointments, see lab results, billing information, and more.  If you have questions regarding this referral, please contact  the [facility] Referrals department at:             [phone]. Monday - Friday 8:00AM - 5:00PM.     Sincerely,    [facility]

## 2025-08-08 ENCOUNTER — APPOINTMENT (OUTPATIENT)
Dept: RADIOLOGY | Facility: MEDICAL CENTER | Age: 65
End: 2025-08-08
Attending: NURSE PRACTITIONER
Payer: MEDICARE

## 2025-08-13 PROBLEM — M65.342 TRIGGER RING FINGER OF LEFT HAND: Status: ACTIVE | Noted: 2025-08-13

## 2025-08-13 PROBLEM — M65.332 TRIGGER MIDDLE FINGER OF LEFT HAND: Status: ACTIVE | Noted: 2025-08-13

## 2025-08-13 PROBLEM — M65.341 TRIGGER RING FINGER OF RIGHT HAND: Status: ACTIVE | Noted: 2025-08-13

## 2025-08-13 PROBLEM — M65.331 TRIGGER MIDDLE FINGER OF RIGHT HAND: Status: ACTIVE | Noted: 2025-08-13
